# Patient Record
Sex: FEMALE | Race: WHITE | NOT HISPANIC OR LATINO
[De-identification: names, ages, dates, MRNs, and addresses within clinical notes are randomized per-mention and may not be internally consistent; named-entity substitution may affect disease eponyms.]

---

## 2017-01-06 ENCOUNTER — APPOINTMENT (OUTPATIENT)
Dept: OTOLARYNGOLOGY | Facility: CLINIC | Age: 32
End: 2017-01-06

## 2017-01-06 VITALS
HEART RATE: 56 BPM | SYSTOLIC BLOOD PRESSURE: 110 MMHG | OXYGEN SATURATION: 99 % | DIASTOLIC BLOOD PRESSURE: 72 MMHG | TEMPERATURE: 97.7 F

## 2017-01-18 ENCOUNTER — APPOINTMENT (OUTPATIENT)
Dept: OTOLARYNGOLOGY | Facility: CLINIC | Age: 32
End: 2017-01-18

## 2017-01-19 LAB
B BURGDOR AB SER-IMP: NEGATIVE
B BURGDOR IGG+IGM SER QL: 0.11 INDEX
T PALLIDUM AB SER QL IA: NEGATIVE

## 2017-01-20 ENCOUNTER — APPOINTMENT (OUTPATIENT)
Dept: MRI IMAGING | Facility: CLINIC | Age: 32
End: 2017-01-20

## 2017-01-23 ENCOUNTER — FORM ENCOUNTER (OUTPATIENT)
Age: 32
End: 2017-01-23

## 2017-01-24 ENCOUNTER — OUTPATIENT (OUTPATIENT)
Dept: OUTPATIENT SERVICES | Facility: HOSPITAL | Age: 32
LOS: 1 days | End: 2017-01-24
Payer: COMMERCIAL

## 2017-01-24 PROCEDURE — 72141 MRI NECK SPINE W/O DYE: CPT

## 2017-01-24 PROCEDURE — 72141 MRI NECK SPINE W/O DYE: CPT | Mod: 26

## 2017-01-25 ENCOUNTER — APPOINTMENT (OUTPATIENT)
Dept: MRI IMAGING | Facility: IMAGING CENTER | Age: 32
End: 2017-01-25

## 2017-01-26 ENCOUNTER — EMERGENCY (EMERGENCY)
Facility: HOSPITAL | Age: 32
LOS: 1 days | Discharge: HOME | End: 2017-01-26
Attending: EMERGENCY MEDICINE | Admitting: EMERGENCY MEDICINE
Payer: COMMERCIAL

## 2017-01-26 ENCOUNTER — APPOINTMENT (OUTPATIENT)
Dept: OTOLARYNGOLOGY | Facility: CLINIC | Age: 32
End: 2017-01-26

## 2017-01-26 VITALS
OXYGEN SATURATION: 100 % | HEART RATE: 58 BPM | SYSTOLIC BLOOD PRESSURE: 109 MMHG | DIASTOLIC BLOOD PRESSURE: 57 MMHG | TEMPERATURE: 98 F | RESPIRATION RATE: 18 BRPM

## 2017-01-26 DIAGNOSIS — R42 DIZZINESS AND GIDDINESS: ICD-10-CM

## 2017-01-26 DIAGNOSIS — Z79.891 LONG TERM (CURRENT) USE OF OPIATE ANALGESIC: ICD-10-CM

## 2017-01-26 PROCEDURE — 99283 EMERGENCY DEPT VISIT LOW MDM: CPT

## 2017-01-26 NOTE — ED PROVIDER NOTE - MEDICAL DECISION MAKING DETAILS
well appearing, nml gait, nml neuro- has outpx neurologist following- continue with current plan- outpx PT- px appears well- is a nurse agrees w d/c

## 2017-01-26 NOTE — ED ADULT TRIAGE NOTE - CHIEF COMPLAINT QUOTE
Sent in by ENT for further evaluation of vertigo, c/o of worsening dizziness since December, denies numbness, headache.

## 2017-01-26 NOTE — ED ADULT NURSE NOTE - OBJECTIVE STATEMENT
Patient referred to ED from ENT office with c/o dizziness. Patient A&Ox3. Ambulating with steady gait. No s/s acute distress noted. Awaiting to be seen. Patient referred to ED from ENT office with c/o dizziness. C/o intermittent nausea, denies any dizziness. Reports head injury 1 1/2 years ago. Patient reports outpatient MRI done. Patient A&Ox3. Ambulating with steady gait. No s/s acute distress noted. Awaiting to be seen.

## 2017-01-26 NOTE — ED PROVIDER NOTE - OBJECTIVE STATEMENT
31 F co chronic dizziness- feels off balance- pressure in head- feels like she's looking down on the world- no falls- no gait abnormalities- no sx- sx worse with changes in position no sob no chest pain  no n/v- has had neg head MRI/neck MRI  has been seen by neuro- rec'd for PT for vertigo  hx of mild concussion about 1 yr ago- otherwise no hx of syncope  px regularly exercises- 6 d a week in the gym

## 2017-02-02 LAB — INNER EAR 68KD AB FLD QL: NEGATIVE

## 2017-02-07 ENCOUNTER — APPOINTMENT (OUTPATIENT)
Dept: MRI IMAGING | Facility: CLINIC | Age: 32
End: 2017-02-07

## 2017-02-07 ENCOUNTER — APPOINTMENT (OUTPATIENT)
Dept: RADIOLOGY | Facility: CLINIC | Age: 32
End: 2017-02-07

## 2017-02-07 ENCOUNTER — OUTPATIENT (OUTPATIENT)
Dept: OUTPATIENT SERVICES | Facility: HOSPITAL | Age: 32
LOS: 1 days | End: 2017-02-07
Payer: COMMERCIAL

## 2017-02-07 DIAGNOSIS — Z00.8 ENCOUNTER FOR OTHER GENERAL EXAMINATION: ICD-10-CM

## 2017-02-07 PROCEDURE — 70544 MR ANGIOGRAPHY HEAD W/O DYE: CPT

## 2017-02-07 PROCEDURE — 70547 MR ANGIOGRAPHY NECK W/O DYE: CPT

## 2017-02-07 PROCEDURE — 72050 X-RAY EXAM NECK SPINE 4/5VWS: CPT

## 2017-09-10 ENCOUNTER — RESULT REVIEW (OUTPATIENT)
Age: 32
End: 2017-09-10

## 2018-07-19 NOTE — ED ADULT NURSE NOTE - CAS DISCH TRANSFER METHOD
Patient states she has taken the last Letrozole Dr Narayan Thacker prescribed and the Rx states no refills - Her surgery is 07/27/18 - Does she need a refill?     Cc: Stephanie Lyons MA
Private car

## 2018-07-31 ENCOUNTER — TRANSCRIPTION ENCOUNTER (OUTPATIENT)
Age: 33
End: 2018-07-31

## 2018-08-10 ENCOUNTER — APPOINTMENT (OUTPATIENT)
Dept: OTOLARYNGOLOGY | Facility: CLINIC | Age: 33
End: 2018-08-10

## 2020-02-08 ENCOUNTER — OUTPATIENT (OUTPATIENT)
Dept: OUTPATIENT SERVICES | Facility: HOSPITAL | Age: 35
LOS: 1 days | End: 2020-02-08
Payer: COMMERCIAL

## 2020-02-08 ENCOUNTER — APPOINTMENT (OUTPATIENT)
Dept: MRI IMAGING | Facility: IMAGING CENTER | Age: 35
End: 2020-02-08
Payer: COMMERCIAL

## 2020-02-08 DIAGNOSIS — Z00.8 ENCOUNTER FOR OTHER GENERAL EXAMINATION: ICD-10-CM

## 2020-02-08 PROCEDURE — 72141 MRI NECK SPINE W/O DYE: CPT | Mod: 26

## 2020-02-08 PROCEDURE — 72141 MRI NECK SPINE W/O DYE: CPT

## 2020-04-25 ENCOUNTER — MESSAGE (OUTPATIENT)
Age: 35
End: 2020-04-25

## 2020-07-02 LAB
SARS-COV-2 IGG SERPL IA-ACNC: 0.09 INDEX
SARS-COV-2 IGG SERPL QL IA: NEGATIVE

## 2020-09-23 ENCOUNTER — TRANSCRIPTION ENCOUNTER (OUTPATIENT)
Age: 35
End: 2020-09-23

## 2020-12-03 ENCOUNTER — APPOINTMENT (OUTPATIENT)
Dept: HUMAN REPRODUCTION | Facility: CLINIC | Age: 35
End: 2020-12-03
Payer: COMMERCIAL

## 2020-12-03 PROCEDURE — 99203 OFFICE O/P NEW LOW 30 MIN: CPT | Mod: 95

## 2021-01-20 ENCOUNTER — APPOINTMENT (OUTPATIENT)
Dept: HUMAN REPRODUCTION | Facility: CLINIC | Age: 36
End: 2021-01-20
Payer: COMMERCIAL

## 2021-01-20 PROCEDURE — 99072 ADDL SUPL MATRL&STAF TM PHE: CPT

## 2021-01-20 PROCEDURE — 99214 OFFICE O/P EST MOD 30 MIN: CPT | Mod: 25

## 2021-01-20 PROCEDURE — 36415 COLL VENOUS BLD VENIPUNCTURE: CPT

## 2021-01-20 PROCEDURE — 76830 TRANSVAGINAL US NON-OB: CPT

## 2021-02-03 ENCOUNTER — APPOINTMENT (OUTPATIENT)
Dept: HUMAN REPRODUCTION | Facility: CLINIC | Age: 36
End: 2021-02-03

## 2021-02-16 ENCOUNTER — APPOINTMENT (OUTPATIENT)
Dept: HUMAN REPRODUCTION | Facility: CLINIC | Age: 36
End: 2021-02-16

## 2021-03-03 ENCOUNTER — APPOINTMENT (OUTPATIENT)
Dept: HUMAN REPRODUCTION | Facility: CLINIC | Age: 36
End: 2021-03-03
Payer: COMMERCIAL

## 2021-03-03 PROCEDURE — 76830 TRANSVAGINAL US NON-OB: CPT

## 2021-03-03 PROCEDURE — 99213 OFFICE O/P EST LOW 20 MIN: CPT | Mod: 25

## 2021-03-03 PROCEDURE — 99072 ADDL SUPL MATRL&STAF TM PHE: CPT

## 2021-03-03 PROCEDURE — 36415 COLL VENOUS BLD VENIPUNCTURE: CPT

## 2021-03-16 ENCOUNTER — APPOINTMENT (OUTPATIENT)
Dept: OBGYN | Facility: CLINIC | Age: 36
End: 2021-03-16
Payer: COMMERCIAL

## 2021-03-16 PROCEDURE — 99213 OFFICE O/P EST LOW 20 MIN: CPT

## 2021-03-16 PROCEDURE — 36415 COLL VENOUS BLD VENIPUNCTURE: CPT

## 2021-03-16 PROCEDURE — 99072 ADDL SUPL MATRL&STAF TM PHE: CPT

## 2021-03-29 ENCOUNTER — APPOINTMENT (OUTPATIENT)
Dept: ANTEPARTUM | Facility: CLINIC | Age: 36
End: 2021-03-29
Payer: COMMERCIAL

## 2021-03-29 PROCEDURE — 76801 OB US < 14 WKS SINGLE FETUS: CPT

## 2021-03-29 PROCEDURE — 99072 ADDL SUPL MATRL&STAF TM PHE: CPT

## 2021-04-05 ENCOUNTER — APPOINTMENT (OUTPATIENT)
Dept: OBGYN | Facility: CLINIC | Age: 36
End: 2021-04-05
Payer: COMMERCIAL

## 2021-04-05 PROCEDURE — 99072 ADDL SUPL MATRL&STAF TM PHE: CPT

## 2021-04-05 PROCEDURE — 36415 COLL VENOUS BLD VENIPUNCTURE: CPT

## 2021-04-12 ENCOUNTER — APPOINTMENT (OUTPATIENT)
Dept: OBGYN | Facility: CLINIC | Age: 36
End: 2021-04-12
Payer: COMMERCIAL

## 2021-04-12 PROCEDURE — 76817 TRANSVAGINAL US OBSTETRIC: CPT

## 2021-04-12 PROCEDURE — 99072 ADDL SUPL MATRL&STAF TM PHE: CPT

## 2021-04-13 ENCOUNTER — TRANSCRIPTION ENCOUNTER (OUTPATIENT)
Age: 36
End: 2021-04-13

## 2021-04-13 ENCOUNTER — APPOINTMENT (OUTPATIENT)
Dept: OBGYN | Facility: CLINIC | Age: 36
End: 2021-04-13

## 2021-04-13 ENCOUNTER — OUTPATIENT (OUTPATIENT)
Dept: OUTPATIENT SERVICES | Facility: HOSPITAL | Age: 36
LOS: 1 days | End: 2021-04-13

## 2021-04-13 DIAGNOSIS — Z20.822 CONTACT WITH AND (SUSPECTED) EXPOSURE TO COVID-19: ICD-10-CM

## 2021-04-13 LAB — SARS-COV-2 RNA SPEC QL NAA+PROBE: SIGNIFICANT CHANGE UP

## 2021-04-13 NOTE — ASU PATIENT PROFILE, ADULT - ABILITY TO HEAR (WITH HEARING AID OR HEARING APPLIANCE IF NORMALLY USED):
uses bilateral hearing aids/Mildly to Moderately Impaired: difficulty hearing in some environments or speaker may need to increase volume or speak distinctly

## 2021-04-14 ENCOUNTER — RESULT REVIEW (OUTPATIENT)
Age: 36
End: 2021-04-14

## 2021-04-14 ENCOUNTER — OUTPATIENT (OUTPATIENT)
Dept: OUTPATIENT SERVICES | Facility: HOSPITAL | Age: 36
LOS: 1 days | Discharge: HOME | End: 2021-04-14
Payer: COMMERCIAL

## 2021-04-14 VITALS
RESPIRATION RATE: 15 BRPM | SYSTOLIC BLOOD PRESSURE: 100 MMHG | HEART RATE: 61 BPM | WEIGHT: 138.01 LBS | OXYGEN SATURATION: 100 % | TEMPERATURE: 98 F | DIASTOLIC BLOOD PRESSURE: 61 MMHG | HEIGHT: 66 IN

## 2021-04-14 VITALS
RESPIRATION RATE: 19 BRPM | OXYGEN SATURATION: 100 % | DIASTOLIC BLOOD PRESSURE: 62 MMHG | HEART RATE: 68 BPM | SYSTOLIC BLOOD PRESSURE: 112 MMHG

## 2021-04-14 DIAGNOSIS — O02.1 MISSED ABORTION: ICD-10-CM

## 2021-04-14 DIAGNOSIS — Z98.890 OTHER SPECIFIED POSTPROCEDURAL STATES: Chronic | ICD-10-CM

## 2021-04-14 LAB
ALBUMIN SERPL ELPH-MCNC: 3.9 G/DL — SIGNIFICANT CHANGE UP (ref 3.3–5)
ALP SERPL-CCNC: 39 U/L — LOW (ref 40–120)
ALT FLD-CCNC: 17 U/L — SIGNIFICANT CHANGE UP (ref 4–33)
ANION GAP SERPL CALC-SCNC: 9 MMOL/L — SIGNIFICANT CHANGE UP (ref 7–14)
APTT BLD: 28.6 SEC — SIGNIFICANT CHANGE UP (ref 27–36.3)
AST SERPL-CCNC: 20 U/L — SIGNIFICANT CHANGE UP (ref 4–32)
BILIRUB SERPL-MCNC: 0.3 MG/DL — SIGNIFICANT CHANGE UP (ref 0.2–1.2)
BLD GP AB SCN SERPL QL: NEGATIVE — SIGNIFICANT CHANGE UP
BUN SERPL-MCNC: 15 MG/DL — SIGNIFICANT CHANGE UP (ref 7–23)
CALCIUM SERPL-MCNC: 8.8 MG/DL — SIGNIFICANT CHANGE UP (ref 8.4–10.5)
CHLORIDE SERPL-SCNC: 106 MMOL/L — SIGNIFICANT CHANGE UP (ref 98–107)
CO2 SERPL-SCNC: 23 MMOL/L — SIGNIFICANT CHANGE UP (ref 22–31)
CREAT SERPL-MCNC: 0.66 MG/DL — SIGNIFICANT CHANGE UP (ref 0.5–1.3)
FIBRINOGEN PPP-MCNC: 308 MG/DL — SIGNIFICANT CHANGE UP (ref 290–520)
GLUCOSE SERPL-MCNC: 107 MG/DL — HIGH (ref 70–99)
HCT VFR BLD CALC: 33.6 % — LOW (ref 34.5–45)
HCT VFR BLD CALC: 36.5 % — SIGNIFICANT CHANGE UP (ref 34.5–45)
HGB BLD-MCNC: 11.7 G/DL — SIGNIFICANT CHANGE UP (ref 11.5–15.5)
HGB BLD-MCNC: 12.7 G/DL — SIGNIFICANT CHANGE UP (ref 11.5–15.5)
INR BLD: 1.07 RATIO — SIGNIFICANT CHANGE UP (ref 0.88–1.16)
MCHC RBC-ENTMCNC: 32.8 PG — SIGNIFICANT CHANGE UP (ref 27–34)
MCHC RBC-ENTMCNC: 32.9 PG — SIGNIFICANT CHANGE UP (ref 27–34)
MCHC RBC-ENTMCNC: 34.8 GM/DL — SIGNIFICANT CHANGE UP (ref 32–36)
MCHC RBC-ENTMCNC: 34.8 GM/DL — SIGNIFICANT CHANGE UP (ref 32–36)
MCV RBC AUTO: 94.3 FL — SIGNIFICANT CHANGE UP (ref 80–100)
MCV RBC AUTO: 94.4 FL — SIGNIFICANT CHANGE UP (ref 80–100)
NRBC # BLD: 0 /100 WBCS — SIGNIFICANT CHANGE UP
NRBC # BLD: 0 /100 WBCS — SIGNIFICANT CHANGE UP
NRBC # FLD: 0 K/UL — SIGNIFICANT CHANGE UP
NRBC # FLD: 0 K/UL — SIGNIFICANT CHANGE UP
PLATELET # BLD AUTO: 206 K/UL — SIGNIFICANT CHANGE UP (ref 150–400)
PLATELET # BLD AUTO: 256 K/UL — SIGNIFICANT CHANGE UP (ref 150–400)
POTASSIUM SERPL-MCNC: 4.1 MMOL/L — SIGNIFICANT CHANGE UP (ref 3.5–5.3)
POTASSIUM SERPL-SCNC: 4.1 MMOL/L — SIGNIFICANT CHANGE UP (ref 3.5–5.3)
PROT SERPL-MCNC: 6.1 G/DL — SIGNIFICANT CHANGE UP (ref 6–8.3)
PROTHROM AB SERPL-ACNC: 12.2 SEC — SIGNIFICANT CHANGE UP (ref 10.6–13.6)
RBC # BLD: 3.56 M/UL — LOW (ref 3.8–5.2)
RBC # BLD: 3.87 M/UL — SIGNIFICANT CHANGE UP (ref 3.8–5.2)
RBC # FLD: 11.8 % — SIGNIFICANT CHANGE UP (ref 10.3–14.5)
RBC # FLD: 11.8 % — SIGNIFICANT CHANGE UP (ref 10.3–14.5)
RH IG SCN BLD-IMP: NEGATIVE — SIGNIFICANT CHANGE UP
SODIUM SERPL-SCNC: 138 MMOL/L — SIGNIFICANT CHANGE UP (ref 135–145)
WBC # BLD: 4.91 K/UL — SIGNIFICANT CHANGE UP (ref 3.8–10.5)
WBC # BLD: 6.28 K/UL — SIGNIFICANT CHANGE UP (ref 3.8–10.5)
WBC # FLD AUTO: 4.91 K/UL — SIGNIFICANT CHANGE UP (ref 3.8–10.5)
WBC # FLD AUTO: 6.28 K/UL — SIGNIFICANT CHANGE UP (ref 3.8–10.5)

## 2021-04-14 PROCEDURE — 88305 TISSUE EXAM BY PATHOLOGIST: CPT | Mod: 26

## 2021-04-14 PROCEDURE — 59840 INDUCED ABORTION D&C: CPT

## 2021-04-14 RX ORDER — SODIUM CHLORIDE 9 MG/ML
3 INJECTION INTRAMUSCULAR; INTRAVENOUS; SUBCUTANEOUS EVERY 8 HOURS
Refills: 0 | Status: DISCONTINUED | OUTPATIENT
Start: 2021-04-14 | End: 2021-04-28

## 2021-04-14 RX ORDER — SODIUM CHLORIDE 9 MG/ML
1000 INJECTION, SOLUTION INTRAVENOUS
Refills: 0 | Status: DISCONTINUED | OUTPATIENT
Start: 2021-04-14 | End: 2021-04-28

## 2021-04-14 RX ORDER — OXYCODONE HYDROCHLORIDE 5 MG/1
5 TABLET ORAL ONCE
Refills: 0 | Status: DISCONTINUED | OUTPATIENT
Start: 2021-04-14 | End: 2021-04-14

## 2021-04-14 RX ADMIN — OXYCODONE HYDROCHLORIDE 5 MILLIGRAM(S): 5 TABLET ORAL at 11:25

## 2021-04-14 NOTE — ASU DISCHARGE PLAN (ADULT/PEDIATRIC) - CALL YOUR DOCTOR IF YOU HAVE ANY OF THE FOLLOWING:
Bleeding that does not stop/Pain not relieved by Medications/Fever greater than (need to indicate Fahrenheit or Celsius)/Nausea and vomiting that does not stop/Unable to urinate/Excessive diarrhea/Inability to tolerate liquids or foods

## 2021-04-14 NOTE — H&P PST ADULT - ATTENDING COMMENTS
Patient seen and examined. She is scheduled for DVC for MAB measuring 8+0wga. Written consent obtained.  SOLITARIO Vega MD

## 2021-04-14 NOTE — BRIEF OPERATIVE NOTE - OPERATION/FINDINGS
Cervix easily dilated to accommodate a hegar size 8. Small amount of POC adherent to endometrial tissue. Ultrasound performed in order to ensure that gestational sac was removed. Ultrasound showed uterine cavity filling with blood. Dr. Almonte called in for assistance. Methergine, pitocin and cytotec given with good resolution of atony.

## 2021-04-14 NOTE — ASU DISCHARGE PLAN (ADULT/PEDIATRIC) - NURSING INSTRUCTIONS
DO NOT take any Tylenol (Acetaminophen) or narcotics containing Tylenol until after  3:30pm. You received Tylenol during your operation and it can cause damage to your liver if too much is taken within a 24 hour time period. You have received ibuprofen in the OR during you procedure.  You may not have another dose until 3:45pm.

## 2021-04-14 NOTE — H&P PST ADULT - ASSESSMENT
35 y.o.  with MAB measuring 8+0wga  -Admit to [facility] for DVC  -T&S - Rh negative. Will need rhogam  -IVF

## 2021-04-14 NOTE — ASU DISCHARGE PLAN (ADULT/PEDIATRIC) - CARE PROVIDER_API CALL
Surya Almonte)  MaternalFetal Medicine; Obstetrics and Gynecology  [address] [place] [id number]  Phone: [phone]  Fax: [phone]  Follow Up Time:

## 2021-04-16 LAB — SURGICAL PATHOLOGY STUDY: SIGNIFICANT CHANGE UP

## 2021-04-20 ENCOUNTER — APPOINTMENT (OUTPATIENT)
Dept: OBGYN | Facility: CLINIC | Age: 36
End: 2021-04-20
Payer: COMMERCIAL

## 2021-04-20 PROCEDURE — 76830 TRANSVAGINAL US NON-OB: CPT

## 2021-04-20 PROCEDURE — 76857 US EXAM PELVIC LIMITED: CPT

## 2021-04-20 PROCEDURE — 99072 ADDL SUPL MATRL&STAF TM PHE: CPT

## 2021-08-17 DIAGNOSIS — Z87.42 PERSONAL HISTORY OF OTHER DISEASES OF THE FEMALE GENITAL TRACT: ICD-10-CM

## 2021-08-18 ENCOUNTER — APPOINTMENT (OUTPATIENT)
Dept: OBGYN | Facility: CLINIC | Age: 36
End: 2021-08-18
Payer: COMMERCIAL

## 2021-08-18 PROBLEM — Z00.00 ENCOUNTER FOR PREVENTIVE HEALTH EXAMINATION: Noted: 2021-08-18

## 2021-08-18 PROCEDURE — ZZZZZ: CPT

## 2021-09-27 ENCOUNTER — APPOINTMENT (OUTPATIENT)
Dept: OBGYN | Facility: CLINIC | Age: 36
End: 2021-09-27

## 2021-09-27 ENCOUNTER — APPOINTMENT (OUTPATIENT)
Dept: OBGYN | Facility: CLINIC | Age: 36
End: 2021-09-27
Payer: COMMERCIAL

## 2021-09-27 VITALS — DIASTOLIC BLOOD PRESSURE: 88 MMHG | WEIGHT: 138 LBS | BODY MASS INDEX: 22.27 KG/M2 | SYSTOLIC BLOOD PRESSURE: 133 MMHG

## 2021-09-27 PROCEDURE — 99395 PREV VISIT EST AGE 18-39: CPT

## 2021-09-27 NOTE — PLAN
[FreeTextEntry1] : 35 year old PX presenting for annual exam.\par -uterus is normal sized and mobile\par -f/u PAP and GC/CT done today\par -contraception:\par -f/u PRN

## 2021-09-29 LAB
C TRACH RRNA SPEC QL NAA+PROBE: NOT DETECTED
HPV 16 E6+E7 MRNA CVX QL NAA+PROBE: NOT DETECTED
HPV HIGH+LOW RISK DNA PNL CVX: NOT DETECTED
HPV18+45 E6+E7 MRNA CVX QL NAA+PROBE: NOT DETECTED
N GONORRHOEA RRNA SPEC QL NAA+PROBE: NOT DETECTED
SOURCE AMPLIFICATION: NORMAL

## 2021-10-04 LAB — CYTOLOGY CVX/VAG DOC THIN PREP: NORMAL

## 2021-11-10 NOTE — ASU DISCHARGE PLAN (ADULT/PEDIATRIC) - A. DRIVE A CAR, OPERATE POWER TOOLS OR MACHINERY
Patient Education     Viral Syndrome (Adult)  A viral illness may cause a number of symptoms such as fever. Other symptoms depend on the part of the body that the virus affects. If it settles in your nose, throat, and lungs, it may cause cough, sore throat, congestion, runny nose, headache, earache and other ear symptoms, or shortness of breath. If it settles in your stomach and intestinal tract, it may cause nausea, vomiting, cramping, and diarrhea. Sometimes it causes generalized symptoms like \"aching all over,\" feeling tired, loss of energy, or loss of appetite.  A viral illness usually lasts anywhere from several days to several weeks, but sometimes it lasts longer. In some cases, a more serious infection can look like a viral syndrome in the first few days of the illness. You may need another exam and additional tests to know the difference. Watch for the warning signs listed below for when to seek medical advice.  Home care  Follow these guidelines for taking care of yourself at home:  · If symptoms are severe, rest at home for the first 2 to 3 days.  · Stay away from cigarette smoke - both your smoke and the smoke from others.  · You may use over-the-counter acetaminophen or ibuprofen for fever, muscle aching, and headache, unless another medicine was prescribed for this. If you have chronic liver or kidney disease or ever had a stomach ulcer or gastrointestinal bleeding, talk with your healthcare provider before using these medicines. No one who is younger than 18 and ill with a fever should take aspirin. It may cause severe disease or death.  · Your appetite may be poor, so a light diet is fine. Avoid dehydration by drinking 8 to 12, 8-ounce glasses of fluids each day. This may include water; orange juice; lemonade; apple, grape, and cranberry juice; clear fruit drinks; electrolyte replacement and sports drinks; and decaffeinated teas and coffee. If you have been diagnosed with a kidney disease, ask your  healthcare provider how much and what types of fluids you should drink to prevent dehydration. If you have kidney disease, drinking too much fluid can cause it build up in the your body and be dangerous to your health.  · Over-the-counter remedies won't shorten the length of the illness but may be helpful for symptoms such as cough, sore throat, nasal and sinus congestion, or diarrhea. Don't use decongestants if you have high blood pressure.  Follow-up care  Follow up with your healthcare provider if you do not improve over the next week.  Call 911  Call 911 if any of the following occur:  · Convulsion  · Feeling weak, dizzy, or like you are going to faint  · Chest pain, or more than mild shortness of breath  When to seek medical advice  Call your healthcare provider right away if any of these occur:  · Cough with lots of colored sputum (mucus) or blood in your sputum  · Chest pain, shortness of breath, wheezing, or trouble breathing  · Severe headache; face, neck, or ear pain  · Severe, constant pain in the lower right side of your belly (abdominal)  · Continued vomiting (can’t keep liquids down)  · Frequent diarrhea (more than 5 times a day); blood (red or black color) or mucus in diarrhea  · Feeling weak, dizzy, or like you are going to faint  · Extreme thirst  · Fever of 100.4°F (38°C) or higher, or as directed by your healthcare provider  Isabel last reviewed this educational content on 4/1/2018 © 2000-2021 [employer]. All rights reserved. This information is not intended as a substitute for professional medical care. Always follow your healthcare professional's instructions.            Statement Selected

## 2022-03-24 ENCOUNTER — TRANSCRIPTION ENCOUNTER (OUTPATIENT)
Age: 37
End: 2022-03-24

## 2022-05-04 ENCOUNTER — APPOINTMENT (OUTPATIENT)
Dept: OBGYN | Facility: CLINIC | Age: 37
End: 2022-05-04
Payer: COMMERCIAL

## 2022-05-04 VITALS
HEIGHT: 66 IN | DIASTOLIC BLOOD PRESSURE: 76 MMHG | BODY MASS INDEX: 22.34 KG/M2 | SYSTOLIC BLOOD PRESSURE: 112 MMHG | WEIGHT: 139 LBS

## 2022-05-04 DIAGNOSIS — Z87.42 PERSONAL HISTORY OF OTHER DISEASES OF THE FEMALE GENITAL TRACT: ICD-10-CM

## 2022-05-04 DIAGNOSIS — Z87.2 PERSONAL HISTORY OF DISEASES OF THE SKIN AND SUBCUTANEOUS TISSUE: ICD-10-CM

## 2022-05-04 DIAGNOSIS — Z87.898 PERSONAL HISTORY OF OTHER SPECIFIED CONDITIONS: ICD-10-CM

## 2022-05-04 DIAGNOSIS — Z86.69 PERSONAL HISTORY OF OTHER DISEASES OF THE NERVOUS SYSTEM AND SENSE ORGANS: ICD-10-CM

## 2022-05-04 DIAGNOSIS — H81.393 OTHER PERIPHERAL VERTIGO, BILATERAL: ICD-10-CM

## 2022-05-04 DIAGNOSIS — Z80.49 FAMILY HISTORY OF MALIGNANT NEOPLASM OF OTHER GENITAL ORGANS: ICD-10-CM

## 2022-05-04 DIAGNOSIS — O21.9 VOMITING OF PREGNANCY, UNSPECIFIED: ICD-10-CM

## 2022-05-04 DIAGNOSIS — O36.0991: ICD-10-CM

## 2022-05-04 DIAGNOSIS — Z80.3 FAMILY HISTORY OF MALIGNANT NEOPLASM OF BREAST: ICD-10-CM

## 2022-05-04 PROCEDURE — 99214 OFFICE O/P EST MOD 30 MIN: CPT

## 2022-05-04 PROCEDURE — 87490 CHLMYD TRACH DNA DIR PROBE: CPT

## 2022-05-04 PROCEDURE — 87590 N.GONORRHOEAE DNA DIR PROB: CPT

## 2022-05-04 PROCEDURE — 36415 COLL VENOUS BLD VENIPUNCTURE: CPT

## 2022-05-04 RX ORDER — ASCORBIC ACID, CHOLECALCIFEROL, .ALPHA.-TOCOPHEROL ACETATE, DL-, PYRIDOXINE, FOLIC ACID, CYANOCOBALAMIN, CALCIUM, FERROUS FUMARATE, MAGNESIUM, DOCONEXENT 85; 200; 10; 25; 1; 12; 140; 27; 45; 300 [IU]/1; [IU]/1; [IU]/1; [IU]/1; MG/1; UG/1; MG/1; MG/1; MG/1; MG/1
27-0.6-0.4-3 CAPSULE, GELATIN COATED ORAL
Qty: 90 | Refills: 4 | Status: COMPLETED | COMMUNITY
Start: 2021-11-01 | End: 2022-05-04

## 2022-05-04 RX ORDER — DOXYLAMINE SUCCINATE AND PYRIDOXINE HYDROCHLORIDE 10; 10 MG/1; MG/1
10-10 TABLET, DELAYED RELEASE ORAL
Qty: 30 | Refills: 6 | Status: ACTIVE | COMMUNITY
Start: 2022-05-04 | End: 1900-01-01

## 2022-05-04 RX ORDER — NORETHINDRONE ACETATE AND ETHINYL ESTRADIOL AND FERROUS FUMARATE 1.5-30(21)
1.5-3 KIT ORAL DAILY
Qty: 1 | Refills: 3 | Status: COMPLETED | COMMUNITY
Start: 2021-09-15 | End: 2022-05-04

## 2022-05-04 RX ORDER — CLOTRIMAZOLE 10 MG/G
1 CREAM TOPICAL
Qty: 30 | Refills: 0 | Status: COMPLETED | COMMUNITY
Start: 2017-01-22 | End: 2022-05-04

## 2022-05-04 RX ORDER — ASCORBIC ACID, CHOLECALCIFEROL, .ALPHA.-TOCOPHEROL ACETATE, DL-, PYRIDOXINE, FOLIC ACID, CYANOCOBALAMIN, CALCIUM, FERROUS FUMARATE, MAGNESIUM, DOCONEXENT 85; 200; 10; 25; 1; 12; 140; 27; 45; 300 [IU]/1; [IU]/1; [IU]/1; [IU]/1; MG/1; UG/1; MG/1; MG/1; MG/1; MG/1
27-0.6-0.4-3 CAPSULE, GELATIN COATED ORAL
Refills: 0 | Status: COMPLETED | COMMUNITY
End: 2022-05-04

## 2022-05-04 RX ORDER — ASCORBIC ACID, CHOLECALCIFEROL, .ALPHA.-TOCOPHEROL ACETATE, DL-, PYRIDOXINE, FOLIC ACID, CYANOCOBALAMIN, CALCIUM, FERROUS FUMARATE, MAGNESIUM, DOCONEXENT 85; 200; 10; 25; 1; 12; 140; 27; 45; 300 [IU]/1; [IU]/1; [IU]/1; [IU]/1; MG/1; UG/1; MG/1; MG/1; MG/1; MG/1
27-0.6-0.4-3 CAPSULE, GELATIN COATED ORAL
Qty: 30 | Refills: 8 | Status: COMPLETED | COMMUNITY
Start: 2021-09-27 | End: 2022-05-04

## 2022-05-04 RX ORDER — PREDNISONE 10 MG/1
10 TABLET ORAL
Qty: 20 | Refills: 0 | Status: COMPLETED | COMMUNITY
Start: 2017-01-22 | End: 2022-05-04

## 2022-05-04 RX ORDER — CHROMIUM 200 MCG
TABLET ORAL
Refills: 0 | Status: ACTIVE | COMMUNITY

## 2022-05-04 RX ORDER — MONTELUKAST 10 MG/1
10 TABLET, FILM COATED ORAL
Qty: 30 | Refills: 0 | Status: COMPLETED | COMMUNITY
Start: 2016-12-12 | End: 2022-05-04

## 2022-05-04 RX ORDER — LEVOCETIRIZINE DIHYDROCHLORIDE 5 MG/1
5 TABLET ORAL
Qty: 30 | Refills: 0 | Status: COMPLETED | COMMUNITY
Start: 2016-11-28 | End: 2022-05-04

## 2022-05-05 LAB
25(OH)D3 SERPL-MCNC: 37 NG/ML
ABO + RH PNL BLD: NORMAL
APPEARANCE: CLEAR
BACTERIA: NEGATIVE
BASOPHILS # BLD AUTO: 0.04 K/UL
BASOPHILS NFR BLD AUTO: 0.4 %
BILIRUBIN URINE: NEGATIVE
BLD GP AB SCN SERPL QL: NORMAL
BLOOD URINE: NEGATIVE
COLOR: YELLOW
EOSINOPHIL # BLD AUTO: 0.27 K/UL
EOSINOPHIL NFR BLD AUTO: 2.9 %
ESTIMATED AVERAGE GLUCOSE: 82 MG/DL
GLUCOSE QUALITATIVE U: NEGATIVE
GLUCOSE SERPL-MCNC: 80 MG/DL
HBA1C MFR BLD HPLC: 4.5 %
HCT VFR BLD CALC: 38.8 %
HGB BLD-MCNC: 13 G/DL
HIV1+2 AB SPEC QL IA.RAPID: NONREACTIVE
HYALINE CASTS: 1 /LPF
IMM GRANULOCYTES NFR BLD AUTO: 0.2 %
KETONES URINE: ABNORMAL
LEUKOCYTE ESTERASE URINE: NEGATIVE
LYMPHOCYTES # BLD AUTO: 2.45 K/UL
LYMPHOCYTES NFR BLD AUTO: 26 %
MAN DIFF?: NORMAL
MCHC RBC-ENTMCNC: 33.1 PG
MCHC RBC-ENTMCNC: 33.5 GM/DL
MCV RBC AUTO: 98.7 FL
MICROSCOPIC-UA: NORMAL
MONOCYTES # BLD AUTO: 0.63 K/UL
MONOCYTES NFR BLD AUTO: 6.7 %
NEUTROPHILS # BLD AUTO: 6.03 K/UL
NEUTROPHILS NFR BLD AUTO: 63.8 %
NITRITE URINE: NEGATIVE
PH URINE: 6
PLATELET # BLD AUTO: 283 K/UL
PROTEIN URINE: NEGATIVE
RBC # BLD: 3.93 M/UL
RBC # FLD: 12.3 %
RED BLOOD CELLS URINE: 4 /HPF
SPECIFIC GRAVITY URINE: 1.02
SQUAMOUS EPITHELIAL CELLS: 1 /HPF
UROBILINOGEN URINE: NORMAL
WBC # FLD AUTO: 9.44 K/UL
WHITE BLOOD CELLS URINE: 1 /HPF

## 2022-05-06 LAB
BACTERIA UR CULT: NORMAL
C TRACH RRNA SPEC QL NAA+PROBE: NOT DETECTED
HAV IGM SER QL: NONREACTIVE
HBV CORE IGM SER QL: NONREACTIVE
HBV SURFACE AG SER QL: NONREACTIVE
HBV SURFACE AG SER QL: NONREACTIVE
HCV AB SER QL: NONREACTIVE
HCV S/CO RATIO: 0.09 S/CO
LEAD BLD-MCNC: <1 UG/DL
MEV IGG FLD QL IA: >300 AU/ML
MEV IGG+IGM SER-IMP: POSITIVE
MUV AB SER-ACNC: POSITIVE
MUV IGG SER QL IA: 78.1 AU/ML
N GONORRHOEA RRNA SPEC QL NAA+PROBE: NOT DETECTED
RUBV IGG FLD-ACNC: 15.4 INDEX
RUBV IGG SER-IMP: POSITIVE
SOURCE AMPLIFICATION: NORMAL
T PALLIDUM AB SER QL IA: NEGATIVE

## 2022-05-08 LAB
HGB A MFR BLD: 97.5 %
HGB A2 MFR BLD: 2.5 %
HGB FRACT BLD-IMP: NORMAL
M TB IFN-G BLD-IMP: NEGATIVE
QUANTIFERON TB PLUS MITOGEN MINUS NIL: 9.98 IU/ML
QUANTIFERON TB PLUS NIL: 0.02 IU/ML
QUANTIFERON TB PLUS TB1 MINUS NIL: 0.02 IU/ML
QUANTIFERON TB PLUS TB2 MINUS NIL: 0.02 IU/ML

## 2022-05-10 LAB — FMR1 GENE MUT ANL BLD/T: NORMAL

## 2022-05-11 LAB — AR GENE MUT ANL BLD/T: NORMAL

## 2022-05-16 ENCOUNTER — APPOINTMENT (OUTPATIENT)
Dept: OBGYN | Facility: CLINIC | Age: 37
End: 2022-05-16
Payer: COMMERCIAL

## 2022-05-16 PROCEDURE — 76801 OB US < 14 WKS SINGLE FETUS: CPT

## 2022-05-27 LAB — CFTR MUT TESTED BLD/T: NEGATIVE

## 2022-06-01 ENCOUNTER — APPOINTMENT (OUTPATIENT)
Dept: OBGYN | Facility: CLINIC | Age: 37
End: 2022-06-01
Payer: COMMERCIAL

## 2022-06-01 VITALS — SYSTOLIC BLOOD PRESSURE: 101 MMHG | WEIGHT: 143 LBS | BODY MASS INDEX: 23.08 KG/M2 | DIASTOLIC BLOOD PRESSURE: 65 MMHG

## 2022-06-01 DIAGNOSIS — Z3A.11 11 WEEKS GESTATION OF PREGNANCY: ICD-10-CM

## 2022-06-01 DIAGNOSIS — Z36.82 ENCOUNTER FOR ANTENATAL SCREENING FOR NUCHAL TRANSLUCENCY: ICD-10-CM

## 2022-06-01 PROCEDURE — 0502F SUBSEQUENT PRENATAL CARE: CPT

## 2022-06-01 PROCEDURE — 76813 OB US NUCHAL MEAS 1 GEST: CPT

## 2022-06-06 LAB
1ST TRIMESTER DATA: NORMAL
ADDENDUM DOC: NORMAL
AFP PNL SERPL: NORMAL
AFP SERPL-ACNC: NORMAL
CLINICAL BIOCHEMIST REVIEW: NORMAL
FREE BETA HCG 1ST TRIMESTER: NORMAL
Lab: NORMAL
NASAL BONE: PRESENT
NOTES NTD: NORMAL
NT: NORMAL
PAPP-A SERPL-ACNC: NORMAL
TRISOMY 18/3: NORMAL

## 2022-06-10 LAB
CLARI ADDITIONAL INFO: NORMAL
CLARI CHROMOSOME 13: NORMAL
CLARI CHROMOSOME 18: NORMAL
CLARI CHROMOSOME 21: NORMAL
CLARI SEX CHROMOSOMES: NORMAL
CLARI TEST COMMENT: NORMAL
CLARITEST NIPT: NORMAL
FETAL FRACT: NORMAL
GESTATION AGE: NORMAL
MATERNAL WEIGHT (LBS):: NORMAL
PLEASE INCLUDE GENDER RESULTS ON THIS REPORT:: NORMAL
TYPE OF PREGNANCY:: NORMAL

## 2022-06-15 ENCOUNTER — NON-APPOINTMENT (OUTPATIENT)
Age: 37
End: 2022-06-15

## 2022-06-15 ENCOUNTER — APPOINTMENT (OUTPATIENT)
Dept: ANTEPARTUM | Facility: CLINIC | Age: 37
End: 2022-06-15

## 2022-06-15 ENCOUNTER — APPOINTMENT (OUTPATIENT)
Dept: OBGYN | Facility: CLINIC | Age: 37
End: 2022-06-15

## 2022-06-15 PROCEDURE — 0502F SUBSEQUENT PRENATAL CARE: CPT

## 2022-06-25 ENCOUNTER — OUTPATIENT (OUTPATIENT)
Dept: OUTPATIENT SERVICES | Facility: HOSPITAL | Age: 37
LOS: 1 days | Discharge: HOME | End: 2022-06-25

## 2022-06-25 VITALS — SYSTOLIC BLOOD PRESSURE: 117 MMHG | HEART RATE: 77 BPM | DIASTOLIC BLOOD PRESSURE: 66 MMHG

## 2022-06-25 VITALS
RESPIRATION RATE: 15 BRPM | DIASTOLIC BLOOD PRESSURE: 66 MMHG | SYSTOLIC BLOOD PRESSURE: 117 MMHG | HEART RATE: 77 BPM | TEMPERATURE: 98 F

## 2022-06-25 DIAGNOSIS — Z98.890 OTHER SPECIFIED POSTPROCEDURAL STATES: Chronic | ICD-10-CM

## 2022-06-25 DIAGNOSIS — Z3A.00 WEEKS OF GESTATION OF PREGNANCY NOT SPECIFIED: ICD-10-CM

## 2022-06-25 DIAGNOSIS — O26.899 OTHER SPECIFIED PREGNANCY RELATED CONDITIONS, UNSPECIFIED TRIMESTER: ICD-10-CM

## 2022-06-25 LAB
BASOPHILS # BLD AUTO: 0.04 K/UL — SIGNIFICANT CHANGE UP (ref 0–0.2)
BASOPHILS NFR BLD AUTO: 0.5 % — SIGNIFICANT CHANGE UP (ref 0–2)
BLD GP AB SCN SERPL QL: NEGATIVE — SIGNIFICANT CHANGE UP
EOSINOPHIL # BLD AUTO: 0.32 K/UL — SIGNIFICANT CHANGE UP (ref 0–0.5)
EOSINOPHIL NFR BLD AUTO: 4.3 % — SIGNIFICANT CHANGE UP (ref 0–6)
HCT VFR BLD CALC: 33.6 % — LOW (ref 34.5–45)
HGB BLD-MCNC: 12.1 G/DL — SIGNIFICANT CHANGE UP (ref 11.5–15.5)
IANC: 4.68 K/UL — SIGNIFICANT CHANGE UP (ref 1.8–7.4)
IMM GRANULOCYTES NFR BLD AUTO: 0.7 % — SIGNIFICANT CHANGE UP (ref 0–1.5)
LYMPHOCYTES # BLD AUTO: 1.62 K/UL — SIGNIFICANT CHANGE UP (ref 1–3.3)
LYMPHOCYTES # BLD AUTO: 22 % — SIGNIFICANT CHANGE UP (ref 13–44)
MCHC RBC-ENTMCNC: 35.1 PG — HIGH (ref 27–34)
MCHC RBC-ENTMCNC: 36 GM/DL — SIGNIFICANT CHANGE UP (ref 32–36)
MCV RBC AUTO: 97.4 FL — SIGNIFICANT CHANGE UP (ref 80–100)
MONOCYTES # BLD AUTO: 0.65 K/UL — SIGNIFICANT CHANGE UP (ref 0–0.9)
MONOCYTES NFR BLD AUTO: 8.8 % — SIGNIFICANT CHANGE UP (ref 2–14)
NEUTROPHILS # BLD AUTO: 4.68 K/UL — SIGNIFICANT CHANGE UP (ref 1.8–7.4)
NEUTROPHILS NFR BLD AUTO: 63.7 % — SIGNIFICANT CHANGE UP (ref 43–77)
NRBC # BLD: 0 /100 WBCS — SIGNIFICANT CHANGE UP
NRBC # FLD: 0 K/UL — SIGNIFICANT CHANGE UP
PLATELET # BLD AUTO: 220 K/UL — SIGNIFICANT CHANGE UP (ref 150–400)
RBC # BLD: 3.45 M/UL — LOW (ref 3.8–5.2)
RBC # FLD: 12.3 % — SIGNIFICANT CHANGE UP (ref 10.3–14.5)
RH IG SCN BLD-IMP: NEGATIVE — SIGNIFICANT CHANGE UP
WBC # BLD: 7.36 K/UL — SIGNIFICANT CHANGE UP (ref 3.8–10.5)
WBC # FLD AUTO: 7.36 K/UL — SIGNIFICANT CHANGE UP (ref 3.8–10.5)

## 2022-06-25 PROCEDURE — 99213 OFFICE O/P EST LOW 20 MIN: CPT

## 2022-06-25 NOTE — OB RN TRIAGE NOTE - FALL HARM RISK - UNIVERSAL INTERVENTIONS
Bed in lowest position, wheels locked, appropriate side rails in place/Call bell, personal items and telephone in reach/Instruct patient to call for assistance before getting out of bed or chair/Non-slip footwear when patient is out of bed/[place] to call system/Physically safe environment - no spills, clutter or unnecessary equipment/Purposeful Proactive Rounding/Room/bathroom lighting operational, light cord in reach

## 2022-06-25 NOTE — OB PROVIDER TRIAGE NOTE - HISTORY OF PRESENT ILLNESS
The patient is a 36 y.o.  @ 14+4wga by LMP presenting with vaginal bleeding. She states that she woke up to use the bathroom and noticed that her underwear was soaked in blood, similar to a period. She then went to the bathroom and had a large amount of blood in the toilet. She denies any pain. She denies any issues with the pregnancy.

## 2022-06-25 NOTE — OB PROVIDER TRIAGE NOTE - PLAN OF CARE
Plan d/w Dr. Vega   As per MD no need to wait for CBC results.   to be discharged home   Return to hospital for heavy vaginal bleeding, cramping, leaking of fluid.   Pelvic rest encouraged   Follow up with Dr. Almonte on Monday

## 2022-06-25 NOTE — OB RN TRIAGE NOTE - NSNURSINGINSTR_OBGYN_ALL_OB_FT
pt evaluated for c/o vaginal bleeding, on exam, no active bleeding, cervix long.  pt s/p Rhogam, plan pt with follow up with , d/c instructions given by [email or web address] np

## 2022-06-25 NOTE — OB PROVIDER TRIAGE NOTE - NSOBPROVIDERNOTE_OBGYN_ALL_OB_FT
36 y.o.  @ 14+4wga with vaginal bleeding  -No active vaginal bleeding on pelvic exam with normal ultrasound  -Rh negative - will give rhogam today  -Bleeding precautions given  -f/u in office on Monday 36 y.o.  @ 14+4wga with vaginal bleeding  -No active vaginal bleeding on pelvic exam with normal ultrasound  -Rh negative - will give rhogam today  -Bleeding precautions given  -f/u in office on Monday      Evaluation done by Dr. Vega   0653: Rhogam administered.   Plan d/w Dr. Brown   Patient can be discharged home at this time.   f/u with Dr. Almonte on Monday

## 2022-06-25 NOTE — OB PROVIDER TRIAGE NOTE - ADDITIONAL INSTRUCTIONS
Return to hospital for heavy vaginal bleeding, cramping, leaking of fluid.   Pelvic rest encouraged   Follow up with Dr. Almonte on Monday

## 2022-06-25 NOTE — OB PROVIDER TRIAGE NOTE - NSHPPHYSICALEXAM_GEN_ALL_CORE
Gen: No acute distress, sitting comfortably on exam table  : 5cc dark red blood in vaginal vault. No active bleeding from cervix. Cervix visually closed    TVUS: CL 3cm

## 2022-06-27 ENCOUNTER — APPOINTMENT (OUTPATIENT)
Dept: OBGYN | Facility: CLINIC | Age: 37
End: 2022-06-27

## 2022-06-27 VITALS — DIASTOLIC BLOOD PRESSURE: 70 MMHG | BODY MASS INDEX: 23.4 KG/M2 | WEIGHT: 145 LBS | SYSTOLIC BLOOD PRESSURE: 104 MMHG

## 2022-06-27 PROCEDURE — 0502F SUBSEQUENT PRENATAL CARE: CPT

## 2022-07-06 ENCOUNTER — APPOINTMENT (OUTPATIENT)
Dept: OBGYN | Facility: CLINIC | Age: 37
End: 2022-07-06

## 2022-07-06 PROCEDURE — 0502F SUBSEQUENT PRENATAL CARE: CPT

## 2022-07-12 ENCOUNTER — NON-APPOINTMENT (OUTPATIENT)
Age: 37
End: 2022-07-12

## 2022-07-12 ENCOUNTER — APPOINTMENT (OUTPATIENT)
Dept: DERMATOLOGY | Facility: CLINIC | Age: 37
End: 2022-07-12

## 2022-07-12 VITALS — WEIGHT: 145 LBS | BODY MASS INDEX: 23.3 KG/M2 | HEIGHT: 66 IN

## 2022-07-12 DIAGNOSIS — L73.0 ACNE KELOID: ICD-10-CM

## 2022-07-12 DIAGNOSIS — Z12.83 ENCOUNTER FOR SCREENING FOR MALIGNANT NEOPLASM OF SKIN: ICD-10-CM

## 2022-07-12 DIAGNOSIS — D22.9 MELANOCYTIC NEVI, UNSPECIFIED: ICD-10-CM

## 2022-07-12 DIAGNOSIS — L70.0 ACNE VULGARIS: ICD-10-CM

## 2022-07-12 DIAGNOSIS — D18.01 HEMANGIOMA OF SKIN AND SUBCUTANEOUS TISSUE: ICD-10-CM

## 2022-07-12 PROCEDURE — 99204 OFFICE O/P NEW MOD 45 MIN: CPT

## 2022-07-12 RX ORDER — AZELAIC ACID 0.15 G/G
15 GEL TOPICAL DAILY
Qty: 1 | Refills: 8 | Status: ACTIVE | COMMUNITY
Start: 2022-07-12 | End: 1900-01-01

## 2022-07-14 LAB
2ND TRIMESTER DATA: NORMAL
AFP PNL SERPL: NORMAL
AFP SERPL-ACNC: NORMAL
CLINICAL BIOCHEMIST REVIEW: NORMAL
NOTES NTD: NORMAL

## 2022-07-28 ENCOUNTER — APPOINTMENT (OUTPATIENT)
Dept: OBGYN | Facility: CLINIC | Age: 37
End: 2022-07-28

## 2022-07-28 ENCOUNTER — APPOINTMENT (OUTPATIENT)
Dept: ANTEPARTUM | Facility: CLINIC | Age: 37
End: 2022-07-28

## 2022-07-28 PROCEDURE — 0502F SUBSEQUENT PRENATAL CARE: CPT

## 2022-07-28 PROCEDURE — 76801 OB US < 14 WKS SINGLE FETUS: CPT

## 2022-08-01 ENCOUNTER — NON-APPOINTMENT (OUTPATIENT)
Age: 37
End: 2022-08-01

## 2022-08-02 ENCOUNTER — APPOINTMENT (OUTPATIENT)
Dept: DERMATOLOGY | Facility: CLINIC | Age: 37
End: 2022-08-02

## 2022-08-02 ENCOUNTER — LABORATORY RESULT (OUTPATIENT)
Age: 37
End: 2022-08-02

## 2022-08-02 DIAGNOSIS — L30.9 DERMATITIS, UNSPECIFIED: ICD-10-CM

## 2022-08-02 DIAGNOSIS — D48.5 NEOPLASM OF UNCERTAIN BEHAVIOR OF SKIN: ICD-10-CM

## 2022-08-02 PROCEDURE — 99213 OFFICE O/P EST LOW 20 MIN: CPT | Mod: 25

## 2022-08-02 PROCEDURE — 11102 TANGNTL BX SKIN SINGLE LES: CPT

## 2022-08-02 RX ORDER — TRIAMCINOLONE ACETONIDE 1 MG/G
0.1 OINTMENT TOPICAL
Qty: 1 | Refills: 1 | Status: ACTIVE | COMMUNITY
Start: 2022-08-02 | End: 1900-01-01

## 2022-08-16 ENCOUNTER — NON-APPOINTMENT (OUTPATIENT)
Age: 37
End: 2022-08-16

## 2022-08-24 ENCOUNTER — APPOINTMENT (OUTPATIENT)
Dept: OBGYN | Facility: CLINIC | Age: 37
End: 2022-08-24

## 2022-08-24 DIAGNOSIS — Z3A.23 23 WEEKS GESTATION OF PREGNANCY: ICD-10-CM

## 2022-08-24 PROCEDURE — 0502F SUBSEQUENT PRENATAL CARE: CPT

## 2022-08-24 PROCEDURE — 36415 COLL VENOUS BLD VENIPUNCTURE: CPT

## 2022-08-31 LAB
BASOPHILS # BLD AUTO: 0.05 K/UL
BASOPHILS NFR BLD AUTO: 0.5 %
EOSINOPHIL # BLD AUTO: 0.21 K/UL
EOSINOPHIL NFR BLD AUTO: 2.1 %
GLUCOSE 1H P 50 G GLC PO SERPL-MCNC: 133 MG/DL
HCT VFR BLD CALC: 37.8 %
HGB BLD-MCNC: 12.8 G/DL
IMM GRANULOCYTES NFR BLD AUTO: 1 %
LYMPHOCYTES # BLD AUTO: 1.72 K/UL
LYMPHOCYTES NFR BLD AUTO: 17.4 %
MAN DIFF?: NORMAL
MCHC RBC-ENTMCNC: 33.9 GM/DL
MCHC RBC-ENTMCNC: 35.1 PG
MCV RBC AUTO: 103.6 FL
MONOCYTES # BLD AUTO: 0.56 K/UL
MONOCYTES NFR BLD AUTO: 5.7 %
NEUTROPHILS # BLD AUTO: 7.27 K/UL
NEUTROPHILS NFR BLD AUTO: 73.3 %
PLATELET # BLD AUTO: 263 K/UL
RBC # BLD: 3.65 M/UL
RBC # FLD: 12.9 %
WBC # FLD AUTO: 9.91 K/UL

## 2022-09-14 ENCOUNTER — NON-APPOINTMENT (OUTPATIENT)
Age: 37
End: 2022-09-14

## 2022-09-21 ENCOUNTER — APPOINTMENT (OUTPATIENT)
Dept: ANTEPARTUM | Facility: CLINIC | Age: 37
End: 2022-09-21

## 2022-09-21 ENCOUNTER — APPOINTMENT (OUTPATIENT)
Dept: OBGYN | Facility: CLINIC | Age: 37
End: 2022-09-21

## 2022-09-21 PROCEDURE — 76816 OB US FOLLOW-UP PER FETUS: CPT

## 2022-09-21 PROCEDURE — 76819 FETAL BIOPHYS PROFIL W/O NST: CPT | Mod: 59

## 2022-09-21 PROCEDURE — 76817 TRANSVAGINAL US OBSTETRIC: CPT

## 2022-09-21 PROCEDURE — 0502F SUBSEQUENT PRENATAL CARE: CPT

## 2022-10-05 ENCOUNTER — APPOINTMENT (OUTPATIENT)
Dept: OBGYN | Facility: CLINIC | Age: 37
End: 2022-10-05

## 2022-10-05 ENCOUNTER — NON-APPOINTMENT (OUTPATIENT)
Age: 37
End: 2022-10-05

## 2022-10-05 VITALS
BODY MASS INDEX: 25.07 KG/M2 | WEIGHT: 156 LBS | HEIGHT: 66 IN | SYSTOLIC BLOOD PRESSURE: 109 MMHG | DIASTOLIC BLOOD PRESSURE: 74 MMHG

## 2022-10-05 PROCEDURE — 0502F SUBSEQUENT PRENATAL CARE: CPT

## 2022-10-05 RX ORDER — HUMAN RHO(D) IMMUNE GLOBULIN 300 UG/1
1500 INJECTION, SOLUTION INTRAMUSCULAR
Refills: 0 | Status: COMPLETED | OUTPATIENT
Start: 2022-10-05

## 2022-10-19 ENCOUNTER — APPOINTMENT (OUTPATIENT)
Dept: OBGYN | Facility: CLINIC | Age: 37
End: 2022-10-19

## 2022-10-19 ENCOUNTER — MED ADMIN CHARGE (OUTPATIENT)
Age: 37
End: 2022-10-19

## 2022-10-19 ENCOUNTER — APPOINTMENT (OUTPATIENT)
Dept: ANTEPARTUM | Facility: CLINIC | Age: 37
End: 2022-10-19

## 2022-10-19 VITALS — DIASTOLIC BLOOD PRESSURE: 79 MMHG | BODY MASS INDEX: 25.82 KG/M2 | SYSTOLIC BLOOD PRESSURE: 120 MMHG | WEIGHT: 160 LBS

## 2022-10-19 PROCEDURE — 0502F SUBSEQUENT PRENATAL CARE: CPT

## 2022-10-19 PROCEDURE — 90471 IMMUNIZATION ADMIN: CPT

## 2022-10-19 PROCEDURE — 90686 IIV4 VACC NO PRSV 0.5 ML IM: CPT

## 2022-10-31 ENCOUNTER — TRANSCRIPTION ENCOUNTER (OUTPATIENT)
Age: 37
End: 2022-10-31

## 2022-10-31 ENCOUNTER — NON-APPOINTMENT (OUTPATIENT)
Age: 37
End: 2022-10-31

## 2022-10-31 ENCOUNTER — APPOINTMENT (OUTPATIENT)
Dept: OBGYN | Facility: CLINIC | Age: 37
End: 2022-10-31

## 2022-10-31 VITALS
WEIGHT: 161 LBS | HEIGHT: 66 IN | BODY MASS INDEX: 25.88 KG/M2 | DIASTOLIC BLOOD PRESSURE: 75 MMHG | SYSTOLIC BLOOD PRESSURE: 109 MMHG

## 2022-10-31 PROCEDURE — ZZZZZ: CPT

## 2022-11-14 ENCOUNTER — APPOINTMENT (OUTPATIENT)
Dept: OBGYN | Facility: CLINIC | Age: 37
End: 2022-11-14

## 2022-11-14 VITALS
WEIGHT: 166 LBS | DIASTOLIC BLOOD PRESSURE: 71 MMHG | SYSTOLIC BLOOD PRESSURE: 113 MMHG | BODY MASS INDEX: 26.68 KG/M2 | HEIGHT: 66 IN

## 2022-11-14 PROCEDURE — 0502F SUBSEQUENT PRENATAL CARE: CPT

## 2022-11-19 ENCOUNTER — NON-APPOINTMENT (OUTPATIENT)
Age: 37
End: 2022-11-19

## 2022-11-21 ENCOUNTER — APPOINTMENT (OUTPATIENT)
Dept: ANTEPARTUM | Facility: CLINIC | Age: 37
End: 2022-11-21

## 2022-11-21 ENCOUNTER — TRANSCRIPTION ENCOUNTER (OUTPATIENT)
Age: 37
End: 2022-11-21

## 2022-11-21 ENCOUNTER — INPATIENT (INPATIENT)
Facility: HOSPITAL | Age: 37
LOS: 0 days | Discharge: HOME | End: 2022-11-22
Attending: OBSTETRICS & GYNECOLOGY | Admitting: OBSTETRICS & GYNECOLOGY

## 2022-11-21 ENCOUNTER — ASOB RESULT (OUTPATIENT)
Age: 37
End: 2022-11-21

## 2022-11-21 VITALS — HEART RATE: 70 BPM | SYSTOLIC BLOOD PRESSURE: 118 MMHG | DIASTOLIC BLOOD PRESSURE: 77 MMHG

## 2022-11-21 DIAGNOSIS — O26.899 OTHER SPECIFIED PREGNANCY RELATED CONDITIONS, UNSPECIFIED TRIMESTER: ICD-10-CM

## 2022-11-21 DIAGNOSIS — Z98.890 OTHER SPECIFIED POSTPROCEDURAL STATES: Chronic | ICD-10-CM

## 2022-11-21 DIAGNOSIS — O9A.213 INJURY, POISONING AND CERTAIN OTHER CONSEQUENCES OF EXTERNAL CAUSES COMPLICATING PREGNANCY, THIRD TRIMESTER: ICD-10-CM

## 2022-11-21 DIAGNOSIS — Z3A.00 WEEKS OF GESTATION OF PREGNANCY NOT SPECIFIED: ICD-10-CM

## 2022-11-21 LAB
APPEARANCE UR: ABNORMAL
APTT BLD: 25.1 SEC — LOW (ref 27–36.3)
BACTERIA # UR AUTO: ABNORMAL
BASOPHILS # BLD AUTO: 0.04 K/UL — SIGNIFICANT CHANGE UP (ref 0–0.2)
BASOPHILS NFR BLD AUTO: 0.5 % — SIGNIFICANT CHANGE UP (ref 0–2)
BILIRUB UR-MCNC: NEGATIVE — SIGNIFICANT CHANGE UP
COLOR SPEC: SIGNIFICANT CHANGE UP
COVID-19 SPIKE DOMAIN AB INTERP: POSITIVE
COVID-19 SPIKE DOMAIN ANTIBODY RESULT: 206 U/ML — HIGH
DIFF PNL FLD: NEGATIVE — SIGNIFICANT CHANGE UP
EOSINOPHIL # BLD AUTO: 0.45 K/UL — SIGNIFICANT CHANGE UP (ref 0–0.5)
EOSINOPHIL NFR BLD AUTO: 5.6 % — SIGNIFICANT CHANGE UP (ref 0–6)
EPI CELLS # UR: 8 /HPF — HIGH (ref 0–5)
FIBRINOGEN PPP-MCNC: 375 MG/DL — SIGNIFICANT CHANGE UP (ref 200–465)
GLUCOSE UR QL: NEGATIVE — SIGNIFICANT CHANGE UP
HCT VFR BLD CALC: 35 % — SIGNIFICANT CHANGE UP (ref 34.5–45)
HGB BLD-MCNC: 12.6 G/DL — SIGNIFICANT CHANGE UP (ref 11.5–15.5)
HYALINE CASTS # UR AUTO: 5 /LPF — SIGNIFICANT CHANGE UP (ref 0–7)
IANC: 4.58 K/UL — SIGNIFICANT CHANGE UP (ref 1.8–7.4)
IMM GRANULOCYTES NFR BLD AUTO: 0.6 % — SIGNIFICANT CHANGE UP (ref 0–0.9)
INR BLD: <0.9 RATIO — SIGNIFICANT CHANGE UP (ref 0.88–1.16)
KETONES UR-MCNC: NEGATIVE — SIGNIFICANT CHANGE UP
KLEIHAUER-BETKE CALCULATION: 0.07 % — SIGNIFICANT CHANGE UP
LEUKOCYTE ESTERASE UR-ACNC: ABNORMAL
LYMPHOCYTES # BLD AUTO: 2.24 K/UL — SIGNIFICANT CHANGE UP (ref 1–3.3)
LYMPHOCYTES # BLD AUTO: 27.9 % — SIGNIFICANT CHANGE UP (ref 13–44)
MCHC RBC-ENTMCNC: 34.8 PG — HIGH (ref 27–34)
MCHC RBC-ENTMCNC: 36 GM/DL — SIGNIFICANT CHANGE UP (ref 32–36)
MCV RBC AUTO: 96.7 FL — SIGNIFICANT CHANGE UP (ref 80–100)
MONOCYTES # BLD AUTO: 0.67 K/UL — SIGNIFICANT CHANGE UP (ref 0–0.9)
MONOCYTES NFR BLD AUTO: 8.3 % — SIGNIFICANT CHANGE UP (ref 2–14)
NEUTROPHILS # BLD AUTO: 4.58 K/UL — SIGNIFICANT CHANGE UP (ref 1.8–7.4)
NEUTROPHILS NFR BLD AUTO: 57.1 % — SIGNIFICANT CHANGE UP (ref 43–77)
NITRITE UR-MCNC: NEGATIVE — SIGNIFICANT CHANGE UP
NRBC # BLD: 0 /100 WBCS — SIGNIFICANT CHANGE UP (ref 0–0)
NRBC # FLD: 0 K/UL — SIGNIFICANT CHANGE UP (ref 0–0)
PH UR: 6.5 — SIGNIFICANT CHANGE UP (ref 5–8)
PLATELET # BLD AUTO: 235 K/UL — SIGNIFICANT CHANGE UP (ref 150–400)
PROT UR-MCNC: ABNORMAL
PROTHROM AB SERPL-ACNC: 10.2 SEC — LOW (ref 10.5–13.4)
RBC # BLD: 3.62 M/UL — LOW (ref 3.8–5.2)
RBC # FLD: 12.1 % — SIGNIFICANT CHANGE UP (ref 10.3–14.5)
RBC CASTS # UR COMP ASSIST: 2 /HPF — SIGNIFICANT CHANGE UP (ref 0–4)
RH IG SCN BLD-IMP: NEGATIVE — SIGNIFICANT CHANGE UP
SARS-COV-2 IGG+IGM SERPL QL IA: 206 U/ML — HIGH
SARS-COV-2 IGG+IGM SERPL QL IA: POSITIVE
SARS-COV-2 RNA SPEC QL NAA+PROBE: SIGNIFICANT CHANGE UP
SP GR SPEC: 1.02 — SIGNIFICANT CHANGE UP (ref 1.01–1.05)
T PALLIDUM AB TITR SER: NEGATIVE — SIGNIFICANT CHANGE UP
UROBILINOGEN FLD QL: SIGNIFICANT CHANGE UP
WBC # BLD: 8.03 K/UL — SIGNIFICANT CHANGE UP (ref 3.8–10.5)
WBC # FLD AUTO: 8.03 K/UL — SIGNIFICANT CHANGE UP (ref 3.8–10.5)
WBC UR QL: 92 /HPF — HIGH (ref 0–5)

## 2022-11-21 PROCEDURE — 73070 X-RAY EXAM OF ELBOW: CPT | Mod: 26,LT,77

## 2022-11-21 PROCEDURE — 76815 OB US LIMITED FETUS(S): CPT | Mod: 26

## 2022-11-21 PROCEDURE — 86077 PHYS BLOOD BANK SERV XMATCH: CPT

## 2022-11-21 PROCEDURE — 73090 X-RAY EXAM OF FOREARM: CPT | Mod: 26,LT

## 2022-11-21 PROCEDURE — 76818 FETAL BIOPHYS PROFILE W/NST: CPT | Mod: 26

## 2022-11-21 PROCEDURE — 73070 X-RAY EXAM OF ELBOW: CPT | Mod: 26,LT

## 2022-11-21 PROCEDURE — 73060 X-RAY EXAM OF HUMERUS: CPT | Mod: 26,LT

## 2022-11-21 PROCEDURE — 76819 FETAL BIOPHYS PROFIL W/O NST: CPT | Mod: 26

## 2022-11-21 PROCEDURE — 73030 X-RAY EXAM OF SHOULDER: CPT | Mod: 26,LT

## 2022-11-21 RX ORDER — SODIUM CHLORIDE 9 MG/ML
1000 INJECTION, SOLUTION INTRAVENOUS
Refills: 0 | Status: DISCONTINUED | OUTPATIENT
Start: 2022-11-21 | End: 2022-11-21

## 2022-11-21 NOTE — PROGRESS NOTE ADULT - SUBJECTIVE AND OBJECTIVE BOX
Patient seen at bedside  States she is feeling well, though she feels mild cramping in the lower right side.  Patient states elbow  and sore    VSS  Abdomen: soft, NT  Left Elbow: generalized tenderness  LE: NT    EFM: Cat1  CNX: low amplitude contractions q5-8    Plan  Patient to continue montoring for 24 hours done at 3am  X-ray for left upper extremity  Discussed with Dr. Almonte

## 2022-11-21 NOTE — OB PROVIDER TRIAGE NOTE - NSOBPROVIDERNOTE_OBGYN_ALL_OB_FT
- fetal movement felt by patient in triage, fetal movement seen on abdominal sonogram and patient verbalizes seeing fetal movement on sonogram  - CBC, coags, KB labs and UA sent  - cervix closed, no vaginal bleeding, no evidence of PPROM - fetal movement felt by patient in triage, fetal movement seen on abdominal sonogram and patient verbalizes seeing fetal movement on sonogram  - No abdominal pain  -   - CBC, coags, KB labs and UA sent  - Cervix closed, no vaginal bleeding, no evidence of PPROM  - Reactive NST, regular contractions q3 minutes  - admit for 24 hour observation d/t   -  Vertex presentation  - Admission labs done  - Consent form signed  - Covid swab done  - Case discussed with Dr. Andrade 35 y/o  at 35.6 weeks evaluated s/p fall @1am and decreased fetal movement  - Fetal movement felt by patient in triage, fetal movement seen on abdominal sonogram and patient verbalizes seeing fetal movement on sonogram  - No abdominal trauma as per patient, no abdominal pain  - Patient able to move elbow, no X ray needed as per Dr. Andrade  - Cervix closed, no vaginal bleeding, no evidence of PPROM  - Reactive NST, regular contractions q3 minutes, patient reports not feeling contractions  - Admit for 24 hour observation d/t contractions  - CBC, coags, KB, UA and admission labs sent  - Vertex presentation  - Admission labs done  - Consent form signed  - Covid swab done  - Case discussed with Dr. Andrade 35 y/o  at 35.6 weeks evaluated s/p fall @1am and decreased fetal movement  - Fetal movement felt by patient in triage, fetal movement seen on abdominal sonogram and patient verbalizes seeing fetal movement on sonogram  - No abdominal trauma as per patient, no abdominal pain  - Patient able to move elbow, no X ray needed as per Dr. Andrade  - Cervix closed, no vaginal bleeding, no evidence of PPROM  - Reactive NST, regular contractions q3 minutes, patient reports not feeling contractions  - Admit for 24 hour observation d/t contractions  - CBC, coags, KB, UA and admission labs sent  - Vertex presentation  - Admission labs done  - Blood transfusion consent form signed  - Covid swab done  - Case discussed with Dr. Andrade and Dr. Juliet Martin 37 y/o  at 35.6 weeks evaluated s/p fall @1am and decreased fetal movement  - Fetal movement felt by patient in triage, fetal movement seen on abdominal sonogram and patient verbalizes seeing fetal movement on sonogram  - No abdominal trauma as per patient, no abdominal pain  - Patient able to move wrist and elbow, no X ray needed as per Dr. Andrade  - Cervix closed, no vaginal bleeding, no evidence of PPROM  - Reactive NST, regular contractions q3 minutes, patient reports not feeling contractions  - Admit for 24 hour observation d/t contractions  - CBC, coags, KB, UA and admission labs sent  - Vertex presentation  - Admission labs done  - Blood transfusion consent form signed  - Covid swab done  - Case discussed with Dr. Andrade and Dr. Juliet Martin PGY-3

## 2022-11-21 NOTE — OB PROVIDER TRIAGE NOTE - NSHPLABSRESULTS_GEN_ALL_CORE
CBC Full  -  ( 21 Nov 2022 02:50 )  WBC Count : 8.03 K/uL  RBC Count : 3.62 M/uL  Hemoglobin : 12.6 g/dL  Hematocrit : 35.0 %  Platelet Count - Automated : 235 K/uL  Mean Cell Volume : 96.7 fL  Mean Cell Hemoglobin : 34.8 pg  Mean Cell Hemoglobin Concentration : 36.0 gm/dL  Auto Neutrophil # : 4.58 K/uL  Auto Lymphocyte # : 2.24 K/uL  Auto Monocyte # : 0.67 K/uL  Auto Eosinophil # : 0.45 K/uL  Auto Basophil # : 0.04 K/uL  Auto Neutrophil % : 57.1 %  Auto Lymphocyte % : 27.9 %  Auto Monocyte % : 8.3 %  Auto Eosinophil % : 5.6 %  Auto Basophil % : 0.5 %    PT/INR - ( 21 Nov 2022 02:50 )   PT: 10.2 sec;   INR: <0.90 ratio         PTT - ( 21 Nov 2022 02:50 )  PTT:25.1 sec

## 2022-11-21 NOTE — OB PROVIDER H&P - HISTORY OF PRESENT ILLNESS
37 y/o  at 35.6 weeks presents s/p fall at 1am. Patient states she was using a pillow for her legs and tripped over it after standing up. Patient reports she fell onto left arm, reports elbow pain, 6-7/10 in severity, still able to move elbow. Denies any abdominal trauma, no abdominal pain. Reports decreased fetal movement after the fall. Denies vaginal bleeding, leakage of fluid, contractions.   Patient had a placenta previa which resolved at 23-24 weeks, had episode of vaginal bleeding at 14 weeks s/p RhoGam. Had second dose of Rhogam on 10/5. Denies any other AP issues or fetal issues.   Blood type A -     Med Hx: denies   Meds: PNV, folic acid,   OBGYN Hx: blighted ovum s/p D&C   uterine polypectomy   denies hx of fibroids, ovarian cysts, abnormal pap smear, STDs  Surg Hx: uterine polypectomy , D&C   Allergies: NKDA

## 2022-11-21 NOTE — OB RN TRIAGE NOTE - FALL HARM RISK - RISK INTERVENTIONS

## 2022-11-21 NOTE — OB PROVIDER TRIAGE NOTE - NSHPPHYSICALEXAM_GEN_ALL_CORE
VS  T(C): 36.3 (11-21-22 @ 02:29)  HR: 70 (11-21-22 @ 02:29)  BP: 118/77 (11-21-22 @ 02:29)  RR: 15 (11-21-22 @ 02:29)  SpO2: --    A&O x 3  Lungs: CTAB  Abd: gravid, soft nontender to palpation  EFM: baseline, moderate variability, + accels, no decels, ctx q3 minutes  SSE: no pooling, leukorrhea noted, negative nitrazine, negative fern  SVE: 0/30/-3  TAS: vertex presentation, posterior placenta, BPP 8/8 DAYA 13.06 VS  T(C): 36.3 (11-21-22 @ 02:29)  HR: 70 (11-21-22 @ 02:29)  BP: 118/77 (11-21-22 @ 02:29)  RR: 15 (11-21-22 @ 02:29)  SpO2: --    A&O x 3  Lungs: CTAB  Abd: gravid, soft nontender to palpation  Extremities: mild left elbow tenderness to palpation, able to move elbow and wrist  EFM: baseline 145, moderate variability, + accels, no decels, ctx q3 minutes  SSE: cervix appears closed, leukorrhea noted, negative nitrazine, negative fern  SVE: 0/30/-3  TAS: vertex presentation, posterior placenta, BPP 8/8 DAYA 13.06 VS  T(C): 36.3 (11-21-22 @ 02:29)  HR: 70 (11-21-22 @ 02:29)  BP: 118/77 (11-21-22 @ 02:29)  RR: 15 (11-21-22 @ 02:29)  SpO2: --    A&O x 3  Lungs: CTAB  Abd: gravid, soft nontender to palpation  Extremities: mild tenderness to palpation of left elbow, able to move elbow and wrist  EFM: baseline 145, moderate variability, + accels, no decels, ctx q3 minutes  SSE: cervix appears closed, leukorrhea noted, negative nitrazine, negative fern  SVE: 0/30/-3  TAS: vertex presentation, posterior placenta, BPP 8/8 DAYA 13.06

## 2022-11-21 NOTE — OB PROVIDER H&P - NSHPPHYSICALEXAM_GEN_ALL_CORE
VS  T(C): 36.3 (11-21-22 @ 02:29)  HR: 70 (11-21-22 @ 02:29)  BP: 118/77 (11-21-22 @ 02:29)  RR: 15 (11-21-22 @ 02:29)  SpO2: --    A&O x 3  Lungs: CTAB  Abd: gravid, soft nontender to palpation  Extremities: mild tenderness to palpation of left elbow, able to move elbow and wrist  EFM: baseline 145, moderate variability, + accels, no decels, ctx q3 minutes  SSE: cervix appears closed, leukorrhea noted, negative nitrazine, negative fern  SVE: 0/30/-3  TAS: vertex presentation, posterior placenta, BPP 8/8 DAYA 13.06

## 2022-11-21 NOTE — CONSULT NOTE ADULT - ASSESSMENT
Assessment and Plan     36y year old Female  with ?L radial head fracture    - Multimodal Pain Control  - Ice, Elevation  - FU [place] views    ****incomplete note***   Assessment and Plan     36y year old Female  with suspected L radial head fracture    - Multimodal Pain Control  - Ice, Elevation  - FU [place]ldon views    ****incomplete note***   Assessment and Plan     36y year old Female with questionable non-displaced L radial head fx.  - WBAT LUE in sling for comfort  - Multimodal Pain Control  - Ice, Elevation  - FU outpt with Dr. Hayden, Dr. Lewis, or Dr. Nation in 1 week  - Remainder of care per primary team

## 2022-11-21 NOTE — OB PROVIDER H&P - PROBLEM SELECTOR PROBLEM 1
Injury, poisoning and certain other consequences of external causes complicating pregnancy, third trimester

## 2022-11-21 NOTE — OB PROVIDER TRIAGE NOTE - HISTORY OF PRESENT ILLNESS
35.6 weeks presents s/p fall at 1am. Patient states she was using a pillow for her legs and tripped over it after standing up. Patient reports she fell onto an outstretched left arm and heard a crack. Denies any abdominal trauma, no abdominal pain. Reports decreased fetal movement after the fall. Denies vaginal bleeding, leakage of fluid.   Patient had a placenta previa which resolved at 23 weeks, had episode of vaginal bleeding at 14 weeks s/o RhoGam. Had Second Rhogam at ***   Blood type A -   Med Hx:  Meds: PNV, folic acid,   OBGYN Hx: blighted ovum s/p D&C   - uterine polypectomy 2012  denies hx of fibroids, ovarian cysts, abnormal pap smear, STDs  Surg Hx: uterine polypectomy 2012, D&C   Allergies: NKDA 35.6 weeks presents s/p fall at 1am. Patient states she was using a pillow for her legs and tripped over it after standing up. Patient reports she fell onto an outstretched left hand and heard a crack. Reports elbow pain, 6-7/10 in severity. Denies any abdominal trauma, no abdominal pain. Reports decreased fetal movement after the fall. Denies vaginal bleeding, leakage of fluid.   Patient had a placenta previa which resolved at 23 weeks, had episode of vaginal bleeding at 14 weeks s/o RhoGam. Had Second Rhogam at ***   Blood type A -   Med Hx:  Meds: PNV, folic acid,   OBGYN Hx: blighted ovum s/p D&C   - uterine polypectomy 2012  denies hx of fibroids, ovarian cysts, abnormal pap smear, STDs  Surg Hx: uterine polypectomy 2012, D&C   Allergies: NKDA 35.6 weeks presents s/p fall at 1am. Patient states she was using a pillow for her legs and tripped over it after standing up. Patient reports she fell onto an outstretched left hand and heard a crack. Reports elbow pain, 6-7/10 in severity. Denies any abdominal trauma, no abdominal pain. Reports decreased fetal movement after the fall. Denies vaginal bleeding, leakage of fluid.   Patient had a placenta previa which resolved at 23 weeks, had episode of vaginal bleeding at 14 weeks s/o RhoGam. Had Second Rhogam at ***   Blood type A -     Med Hx:  Meds: PNV, folic acid,   OBGYN Hx: blighted ovum s/p D&C   - uterine polypectomy 2012  denies hx of fibroids, ovarian cysts, abnormal pap smear, STDs  Surg Hx: uterine polypectomy 2012, D&C   Allergies: NKDA 35.6 weeks presents s/p fall at 1am. Patient states she was using a pillow for her legs and tripped over it after standing up. Patient reports she fell onto an outstretched left hand and heard a crack. Reports elbow pain, 6-7/10 in severity. Is able to move left elbow. Denies any abdominal trauma, no abdominal pain. Reports decreased fetal movement after the fall. Denies vaginal bleeding, leakage of fluid, contractions.   Patient had a placenta previa which resolved at 23-24 weeks, had episode of vaginal bleeding at 14 weeks s/o RhoGam. Had Second dose of Rhogam 10/5. Denies any other AP issues or fetal issues.   Blood type A -     Med Hx: denies   Meds: PNV, folic acid,   OBGYN Hx: blighted ovum s/p D&C   - uterine polypectomy 2012  denies hx of fibroids, ovarian cysts, abnormal pap smear, STDs  Surg Hx: uterine polypectomy 2012, D&C   Allergies: NKDA 37 y/o  at 35.6 weeks presents s/p fall at 1am. Patient states she was using a pillow for her legs and tripped over it after standing up. Patient reports she fell onto left arm, reports elbow pain, 6-7/10 in severity, is able to move left elbow. Denies any abdominal trauma, no abdominal pain. Reports decreased fetal movement after the fall. Denies vaginal bleeding, leakage of fluid, contractions.   Patient had a placenta previa which resolved at 23-24 weeks, had episode of vaginal bleeding at 14 weeks s/p RhoGam. Had second dose of Rhogam on 10/5. Denies any other AP issues or fetal issues.   Blood type A -     Med Hx: denies   Meds: PNV, folic acid,   OBGYN Hx: blighted ovum s/p D&C   uterine polypectomy   denies hx of fibroids, ovarian cysts, abnormal pap smear, STDs  Surg Hx: uterine polypectomy , D&C   Allergies: NKDA 37 y/o  at 35.6 weeks presents s/p fall at 1am. Patient states she was using a pillow for her legs and tripped over it after standing up. Patient reports she fell onto left arm, reports elbow pain, 6-7/10 in severity, still able to move elbow. Denies any abdominal trauma, no abdominal pain. Reports decreased fetal movement after the fall. Denies vaginal bleeding, leakage of fluid, contractions.   Patient had a placenta previa which resolved at 23-24 weeks, had episode of vaginal bleeding at 14 weeks s/p RhoGam. Had second dose of Rhogam on 10/5. Denies any other AP issues or fetal issues.   Blood type A -     Med Hx: denies   Meds: PNV, folic acid,   OBGYN Hx: blighted ovum s/p D&C   uterine polypectomy   denies hx of fibroids, ovarian cysts, abnormal pap smear, STDs  Surg Hx: uterine polypectomy , D&C   Allergies: NKDA

## 2022-11-21 NOTE — OB RN PATIENT PROFILE - FALL HARM RISK - RISK INTERVENTIONS

## 2022-11-21 NOTE — OB RN TRIAGE NOTE - CHIEF COMPLAINT QUOTE
I fell at 0100, no direct trauma to the belly, I used by left arm to break the fall.  I am feeling less movement since the fall.

## 2022-11-21 NOTE — CONSULT NOTE ADULT - SUBJECTIVE AND OBJECTIVE BOX
HPI:   36y year old 35 week pregnant female seen s/p mechanical fall this morning in which she landed on her left outstretched hand.  Patient states she felt a "pop" in her elbow when she landed and immediate pain. Patient denies pain in any other joint, numbness, tingling, paresthesias. No pain in any other extremities.  No Head trauma or LOC.    PMH/PSH:  PAST MEDICAL & SURGICAL HISTORY:  No pertinent past medical history      History of dilatation and curettage  polypectomy 2012          Allergies:  No Known Allergies      O:  T(C): 36.4 (11-21-22 @ 13:56), Max: 36.9 (11-21-22 @ 03:58)  HR: 74 (11-21-22 @ 13:56) (69 - 74)  BP: 105/63 (11-21-22 @ 13:56) (105/63 - 126/83)  RR: 15 (11-21-22 @ 03:58) (15 - 15)  SpO2: --    Gen: NAD  Resp: Non-labored breathing  LUE   Full AROM with no TTP over radial head  Skin intact with no visible deformity of elbow  SILT med/rad/ulnar/axillary  +AIN/PIN/Med/Ulnar  Radial pulse palpable, WWP distally      Imaging:  XR L elbow demonstrating a moderate elbow joint effusion. Curvilinear lucency within the   radial head/neck on the oblique view, which may represent a nondisplaced   fracture. Cortical irregularity along the lateral aspect of the   capitellum, which may represent small chip fracture. No dislocation.          Assessment and Plan     36y year old Female  with ?L radial head fracture    - Multimodal Pain Control  - Ice, Elevation  - FU imaging     ****incomplete note***        HPI:   36y year old 35 week pregnant female seen s/p mechanical fall this morning in which she landed on her left outstretched hand.  Patient states she felt a "pop" in her elbow when she landed and immediate pain. Patient denies pain in any other joint, numbness, tingling, paresthesias. No pain in any other extremities.  No Head trauma or LOC.    PMH/PSH:  PAST MEDICAL & SURGICAL HISTORY:  No pertinent past medical history      History of dilatation and curettage  polypectomy 2012          Allergies:  No Known Allergies      O:  T(C): 36.4 (11-21-22 @ 13:56), Max: 36.9 (11-21-22 @ 03:58)  HR: 74 (11-21-22 @ 13:56) (69 - 74)  BP: 105/63 (11-21-22 @ 13:56) (105/63 - 126/83)  RR: 15 (11-21-22 @ 03:58) (15 - 15)  SpO2: --    Gen: NAD  Resp: Non-labored breathing  LUE   Full AROM with no TTP over radial head  Skin intact with no visible deformity of elbow  SILT med/rad/ulnar/axillary  +AIN/PIN/Med/Ulnar  Radial pulse palpable, WWP distally      Imaging:  XR L elbow demonstrating a moderate elbow joint effusion. Curvilinear lucency within the   radial head/neck on the oblique view, which may represent a nondisplaced   fracture. Cortical irregularity along the lateral aspect of the   capitellum, which may represent small chip fracture. No dislocation.              HPI:   36y year old 35 week pregnant female seen s/p mechanical fall this morning in which she landed on her left outstretched hand.  Patient states she felt a "pop" in her elbow when she landed and immediate pain. Patient denies pain in any other joint, numbness, tingling, paresthesias. No pain in any other extremities.  No Head trauma or LOC.    PMH/PSH:  PAST MEDICAL & SURGICAL HISTORY:  No pertinent past medical history      History of dilatation and curettage  polypectomy 2012      Allergies:  No Known Allergies      O:  T(C): 36.4 (11-21-22 @ 13:56), Max: 36.9 (11-21-22 @ 03:58)  HR: 74 (11-21-22 @ 13:56) (69 - 74)  BP: 105/63 (11-21-22 @ 13:56) (105/63 - 126/83)  RR: 15 (11-21-22 @ 03:58) (15 - 15)  SpO2: --    Gen: NAD  Resp: Non-labored breathing  LUE   Full AROM (flexion/extension/pronation/supination) with no TTP over radial head or mechanical blocks to motion  Skin intact with no visible deformity of elbow  SILT med/rad/ulnar/axillary  +AIN/PIN/Med/Ulnar  Radial pulse palpable, WWP distally      Imaging:  XR L elbow demonstrating a moderate elbow joint effusion. Curvilinear lucency within the   radial head/neck on the oblique view, which may represent a nondisplaced   fracture. Cortical irregularity along the lateral aspect of the   capitellum, which may represent small chip fracture. No dislocation.

## 2022-11-21 NOTE — OB RN PATIENT PROFILE - NAME OF FATHER, OB PROFILE
Fever greater than (need to indicate Fahrenheit or Celsius)/Inability to tolerate liquids or foods/Wound/Surgical Site with redness, or foul smelling discharge or pus/Bleeding that does not stop Manuel

## 2022-11-21 NOTE — OB PROVIDER H&P - ASSESSMENT
35 y/o  at 35.6 weeks evaluated s/p fall @1am and decreased fetal movement  - Fetal movement felt by patient in triage, fetal movement seen on abdominal sonogram and patient verbalizes seeing fetal movement on sonogram  - No abdominal trauma as per patient, no abdominal pain  - Patient able to move wrist and elbow, no X ray needed as per Dr. Andrade  - Cervix closed, no vaginal bleeding, no evidence of PPROM  - Reactive NST, regular contractions q3 minutes, patient reports not feeling contractions  - Admit for 24 hour observation d/t contractions  - CBC, coags, KB, UA and admission labs sent  - Vertex presentation  - Admission labs done  - Blood transfusion consent form signed  - Covid swab done  - Case discussed with Dr. Andrade and Dr. Juliet Martin PGY-3

## 2022-11-21 NOTE — OB RN TRIAGE NOTE - HOW OFTEN DO YOU HAVE A DRINK CONTAINING ALCOHOL?
Anesthesia ROS/Med Hx        Pulmonary Review:  Pulmonary Comments: Suspected sleep apnea  PEREZ  Quit smoking 2 months ago  Pt. positive for sleep apnea   Pt. positive for shortness of breath  The patient is a former smoker with a 20 pack-year history.     Neuro/Psych Review:    Pt. positive for psychiatric history    Cardiovascular Review:    Pt. positive for hypertension  Pt. positive for hyperlipidemia    GI/HEPATIC/RENAL Review:  GI/Hepatic/Renal Review Comments: Abdominal pain    End/Other Review:    Pt. positive for obesity class I - 30.00 - 34.99  Overall Review of Systems Comments:  H/o \"violent emergence\" after last two tonsil surgeries.      Anesthesia Plan     ASA Status: 2  Anesthesia Type: MAC  Reviewed: Lab Results, Medications, Problem List, Past Med History, Allergies, Beta Blocker Status, Patient Summary and NPO Status  The proposed anesthetic plan, including its risks and benefits, have been discussed with the Patient - along with the risks and benefits of alternatives.  Questions were encouraged and answered and the patient and/or representative agrees to proceed.  Plan Comments: MAC  RT for Bipap.      Physical Exam  Mallampati: II  TM Distance: >3 FB  Cardio Rhythm: Regular  Cardio Rate: Normal  Breath sounds clear to auscultation:  Yes  Patient Demonstrates:  Obese  dental exam normal                  
Never

## 2022-11-22 VITALS — SYSTOLIC BLOOD PRESSURE: 127 MMHG | HEART RATE: 73 BPM | DIASTOLIC BLOOD PRESSURE: 72 MMHG

## 2022-11-22 VITALS — TEMPERATURE: 98 F

## 2022-11-22 DIAGNOSIS — Z04.9 ENCOUNTER FOR EXAMINATION AND OBSERVATION FOR UNSPECIFIED REASON: ICD-10-CM

## 2022-11-22 LAB
GROUP B BETA STREP DNA (PCR): SIGNIFICANT CHANGE UP
SOURCE GROUP B STREP: SIGNIFICANT CHANGE UP

## 2022-11-22 NOTE — DISCHARGE NOTE ANTEPARTUM - MEDICATION SUMMARY - MEDICATIONS TO TAKE
I will START or STAY ON the medications listed below when I get home from the hospital:    folic acid  -- Indication: For vitamins    fish oil  -- Indication: For vitamins    PNV Prenatal oral tablet  -- 1 tab(s) by mouth once a day  -- Indication: For vitamins

## 2022-11-22 NOTE — DISCHARGE NOTE ANTEPARTUM - PLAN OF CARE
Follow up with orthopedics as recommended for further management. Continue Tylenol as needed for any pain.    Follow up with your OBGYN as scheduled for routine prenatal care. Return to Labor and Delivery if you experience any decreased fetal movement, leakage of fluid, vaginal bleeding, or painful and regular contractions.

## 2022-11-22 NOTE — DISCHARGE NOTE ANTEPARTUM - CARE PLAN
1 Principal Discharge DX:	Injury, poisoning and certain other consequences of external causes complicating pregnancy, third trimester  Assessment and plan of treatment:	Follow up with orthopedics as recommended for further management. Continue Tylenol as needed for any pain.    Follow up with your OBGYN as scheduled for routine prenatal care. Return to Labor and Delivery if you experience any decreased fetal movement, leakage of fluid, vaginal bleeding, or painful and regular contractions.

## 2022-11-22 NOTE — DISCHARGE NOTE ANTEPARTUM - PATIENT PORTAL LINK FT
You can access the FollowMyHealth Patient Portal offered by [facility] by registering at the following website: [email or web address]. By joining [employer]’s FollowMyHealth portal, you will also be able to view your health information using other applications (apps) compatible with our system.

## 2022-11-22 NOTE — DISCHARGE NOTE ANTEPARTUM - HOSPITAL COURSE
Patient presented for feeling decreased fetal movement after fall. On presentation to triage, patient noted to be jessica on tocometer with intermittently category 2 FHT. Patient was admitted for 24 hours observation after fall. Ortho was consulted to evaluate patient's left hand on which she fell. Imaging revealed non-displaced left radial head fracture, for which she received a splint. Orthopedic recommendations included outpatient followup. During patient's admission, fetal status remained reassuring. Patient was discharged on HD#2 in stable condition with instruction to follow up with OBGYN and orthopedics outpatient.

## 2022-11-22 NOTE — DISCHARGE NOTE ANTEPARTUM - CARE PROVIDER_API CALL
Surya Almonte)  MaternalFetal Medicine; Obstetrics and Gynecology  [address] [place] [id number]  Phone: [phone]  Fax: [phone]  Follow Up Time:

## 2022-11-28 ENCOUNTER — NON-APPOINTMENT (OUTPATIENT)
Age: 37
End: 2022-11-28

## 2022-11-28 ENCOUNTER — APPOINTMENT (OUTPATIENT)
Dept: OBGYN | Facility: CLINIC | Age: 37
End: 2022-11-28

## 2022-11-28 VITALS — WEIGHT: 170 LBS | SYSTOLIC BLOOD PRESSURE: 126 MMHG | DIASTOLIC BLOOD PRESSURE: 87 MMHG | BODY MASS INDEX: 27.44 KG/M2

## 2022-11-28 PROCEDURE — 0502F SUBSEQUENT PRENATAL CARE: CPT

## 2022-12-07 ENCOUNTER — APPOINTMENT (OUTPATIENT)
Dept: ANTEPARTUM | Facility: CLINIC | Age: 37
End: 2022-12-07

## 2022-12-07 ENCOUNTER — APPOINTMENT (OUTPATIENT)
Dept: OBGYN | Facility: CLINIC | Age: 37
End: 2022-12-07

## 2022-12-07 VITALS
BODY MASS INDEX: 27.97 KG/M2 | HEIGHT: 66 IN | WEIGHT: 174 LBS | DIASTOLIC BLOOD PRESSURE: 85 MMHG | SYSTOLIC BLOOD PRESSURE: 121 MMHG

## 2022-12-07 PROCEDURE — 0502F SUBSEQUENT PRENATAL CARE: CPT

## 2022-12-07 PROCEDURE — 76816 OB US FOLLOW-UP PER FETUS: CPT

## 2022-12-07 PROCEDURE — 76818 FETAL BIOPHYS PROFILE W/NST: CPT | Mod: 59

## 2022-12-14 ENCOUNTER — APPOINTMENT (OUTPATIENT)
Dept: ANTEPARTUM | Facility: CLINIC | Age: 37
End: 2022-12-14

## 2022-12-14 ENCOUNTER — APPOINTMENT (OUTPATIENT)
Dept: OBGYN | Facility: CLINIC | Age: 37
End: 2022-12-14

## 2022-12-14 DIAGNOSIS — Z33.1 PREGNANT STATE, INCIDENTAL: ICD-10-CM

## 2022-12-14 DIAGNOSIS — Z00.00 ENCOUNTER FOR GENERAL ADULT MEDICAL EXAMINATION W/OUT ABNORMAL FINDINGS: ICD-10-CM

## 2022-12-14 PROCEDURE — 0502F SUBSEQUENT PRENATAL CARE: CPT

## 2022-12-14 PROCEDURE — 76818 FETAL BIOPHYS PROFILE W/NST: CPT

## 2022-12-15 ENCOUNTER — INPATIENT (INPATIENT)
Facility: HOSPITAL | Age: 37
LOS: 1 days | Discharge: HOME | End: 2022-12-17
Attending: OBSTETRICS & GYNECOLOGY | Admitting: OBSTETRICS & GYNECOLOGY

## 2022-12-15 DIAGNOSIS — Z98.890 OTHER SPECIFIED POSTPROCEDURAL STATES: Chronic | ICD-10-CM

## 2022-12-15 DIAGNOSIS — Z78.9 OTHER SPECIFIED HEALTH STATUS: ICD-10-CM

## 2022-12-15 LAB
BASOPHILS # BLD AUTO: 0.05 K/UL — SIGNIFICANT CHANGE UP (ref 0–0.2)
BASOPHILS NFR BLD AUTO: 0.6 % — SIGNIFICANT CHANGE UP (ref 0–2)
EOSINOPHIL # BLD AUTO: 0.2 K/UL — SIGNIFICANT CHANGE UP (ref 0–0.5)
EOSINOPHIL NFR BLD AUTO: 2.3 % — SIGNIFICANT CHANGE UP (ref 0–6)
HCT VFR BLD CALC: 37 % — SIGNIFICANT CHANGE UP (ref 34.5–45)
HGB BLD-MCNC: 12.9 G/DL — SIGNIFICANT CHANGE UP (ref 11.5–15.5)
IANC: 5.46 K/UL — SIGNIFICANT CHANGE UP (ref 1.8–7.4)
IMM GRANULOCYTES NFR BLD AUTO: 0.3 % — SIGNIFICANT CHANGE UP (ref 0–0.9)
LYMPHOCYTES # BLD AUTO: 2.33 K/UL — SIGNIFICANT CHANGE UP (ref 1–3.3)
LYMPHOCYTES # BLD AUTO: 26.8 % — SIGNIFICANT CHANGE UP (ref 13–44)
MCHC RBC-ENTMCNC: 33.8 PG — SIGNIFICANT CHANGE UP (ref 27–34)
MCHC RBC-ENTMCNC: 34.9 GM/DL — SIGNIFICANT CHANGE UP (ref 32–36)
MCV RBC AUTO: 96.9 FL — SIGNIFICANT CHANGE UP (ref 80–100)
MONOCYTES # BLD AUTO: 0.61 K/UL — SIGNIFICANT CHANGE UP (ref 0–0.9)
MONOCYTES NFR BLD AUTO: 7 % — SIGNIFICANT CHANGE UP (ref 2–14)
NEUTROPHILS # BLD AUTO: 5.46 K/UL — SIGNIFICANT CHANGE UP (ref 1.8–7.4)
NEUTROPHILS NFR BLD AUTO: 63 % — SIGNIFICANT CHANGE UP (ref 43–77)
NRBC # BLD: 0 /100 WBCS — SIGNIFICANT CHANGE UP (ref 0–0)
NRBC # FLD: 0 K/UL — SIGNIFICANT CHANGE UP (ref 0–0)
PLATELET # BLD AUTO: 248 K/UL — SIGNIFICANT CHANGE UP (ref 150–400)
RBC # BLD: 3.82 M/UL — SIGNIFICANT CHANGE UP (ref 3.8–5.2)
RBC # FLD: 11.9 % — SIGNIFICANT CHANGE UP (ref 10.3–14.5)
SARS-COV-2 N GENE NPH QL NAA+PROBE: NOT DETECTED
WBC # BLD: 8.68 K/UL — SIGNIFICANT CHANGE UP (ref 3.8–10.5)
WBC # FLD AUTO: 8.68 K/UL — SIGNIFICANT CHANGE UP (ref 3.8–10.5)

## 2022-12-15 PROCEDURE — 86077 PHYS BLOOD BANK SERV XMATCH: CPT

## 2022-12-15 RX ORDER — CITRIC ACID/SODIUM CITRATE 300-500 MG
15 SOLUTION, ORAL ORAL EVERY 6 HOURS
Refills: 0 | Status: DISCONTINUED | OUTPATIENT
Start: 2022-12-15 | End: 2022-12-16

## 2022-12-15 RX ORDER — OMEGA-3 ACID ETHYL ESTERS 1 G
0 CAPSULE ORAL
Qty: 0 | Refills: 0 | DISCHARGE

## 2022-12-15 RX ORDER — OXYTOCIN 10 UNIT/ML
333.33 VIAL (ML) INJECTION
Qty: 20 | Refills: 0 | Status: DISCONTINUED | OUTPATIENT
Start: 2022-12-15 | End: 2022-12-16

## 2022-12-15 RX ORDER — SODIUM CHLORIDE 9 MG/ML
1000 INJECTION, SOLUTION INTRAVENOUS
Refills: 0 | Status: DISCONTINUED | OUTPATIENT
Start: 2022-12-15 | End: 2022-12-16

## 2022-12-15 RX ORDER — FOLIC ACID 0.8 MG
0 TABLET ORAL
Qty: 0 | Refills: 0 | DISCHARGE

## 2022-12-15 RX ORDER — CHLORHEXIDINE GLUCONATE 213 G/1000ML
1 SOLUTION TOPICAL ONCE
Refills: 0 | Status: DISCONTINUED | OUTPATIENT
Start: 2022-12-15 | End: 2022-12-16

## 2022-12-15 NOTE — OB PROVIDER H&P - HISTORY OF PRESENT ILLNESS
HPI: Pt is a 35 yo  @39.2wks, CASSIA: 22, presenting as a scheduled risk reducing induction of labor. Patient states resolved placenta previa @20 wks; otherwise prenatal course uncomplicated per patient. Patient endorses positive fetal movement. Denies LOF/VB/CTX. Patient denies any complaints at this time.  GBS negative  Covid negative  EFW: 3570g      OBHx: SAB x1 with D&C-   GynHx: denies hx of fibroids, cysts, abnormal Pap smears or STDs. Hx of uterine polypectomy   PMHx: Childhood asthma- No meds, no intubations; Hx of fractured left elbow from fall   PSHx: D&C x 1  Med: PNV  All: NKDA  Psych: denies hx of depression or anxiety  SH: denies hx of smoking, drinking, or drug usage during the pregnancy    Vital Signs Last 24 Hrs  T(C): --  T(F): --  HR: --  BP: --  BP(mean): --  RR: --  SpO2: --        Physical Exam:  Gen: NAD, AxOx3  CV: RRR  Resp: CTAB  Abd: soft, NT, gravid        SVE: 3/50/-3  FHT: Category 1  [place]: Irregular   EFW: 3570g  Sono: Cephalic      A/P: Pt is a 37yo  @ 39.2wks, CASSIA: 22, who presents as a scheduled risk reducing induction of labor.  - GBS negative     1. Admit to LND. Routine Labs. IVF  2. IOL with buccal cytotec  3. Fetus: Cat 1tracing, Vertex, EFW 3570g c/w EFM.  4 GBS negative   5 Pain: IV pain meds/epidural PRN  6 Covid negative 22    FARSHAD Amaral  Discussed with MD Chin

## 2022-12-15 NOTE — OB RN PATIENT PROFILE - NSICDXPASTMEDICALHX_GEN_ALL_CORE_FT
PAST MEDICAL HISTORY:  Arm fracture, left     Fall at home     No pertinent past medical history     Spontaneous

## 2022-12-15 NOTE — OB RN PATIENT PROFILE - FALL HARM RISK - RISK INTERVENTIONS

## 2022-12-15 NOTE — OB RN PATIENT PROFILE - THE IMPORTANCE OF INITIATING BREASTFEEDING WITHIN THE FIRST HOUR OF BIRTH.
bilateral L5 transforaminal epidural steroid injection with RN IV sedation scheduled 10/05/2022. Patient has had Covid vaccinations. Pre admit instructions mailed to patient.       Statement Selected

## 2022-12-15 NOTE — OB PROVIDER H&P - CLICK TO LAUNCH ORM
IMPRESSION:  Supratherapeutic INR  hematuria likely due to elevated INR  HO Afib      PLAN:    CNS: avoid sedation    HEENT: Oral care    PULMONARY:  HOB @ 45 degrees.  Aspiration precautions     CARDIOVASCULAR: adequate MAP    GI: GI prophylaxis.  Feeding.  Bowel regimen     RENAL:  Follow up lytes.  Correct as needed, urology inpatient vs outpatient    INFECTIOUS DISEASE: Follow up cultures    HEMATOLOGICAL:  elevated INR, monitor daily    ENDOCRINE:  Follow up FS.  Insulin protocol if needed.    MUSCULOSKELETAL:  as tolerated    recall as needed        
.

## 2022-12-16 ENCOUNTER — TRANSCRIPTION ENCOUNTER (OUTPATIENT)
Age: 37
End: 2022-12-16

## 2022-12-16 LAB
BLD GP AB SCN SERPL QL: POSITIVE — SIGNIFICANT CHANGE UP
COVID-19 SPIKE DOMAIN AB INTERP: POSITIVE
COVID-19 SPIKE DOMAIN ANTIBODY RESULT: 222 U/ML — HIGH
RH IG SCN BLD-IMP: NEGATIVE — SIGNIFICANT CHANGE UP
SARS-COV-2 IGG+IGM SERPL QL IA: 222 U/ML — HIGH
SARS-COV-2 IGG+IGM SERPL QL IA: POSITIVE
T PALLIDUM AB TITR SER: NEGATIVE — SIGNIFICANT CHANGE UP

## 2022-12-16 PROCEDURE — 59400 OBSTETRICAL CARE: CPT

## 2022-12-16 RX ORDER — HYDROCORTISONE 1 %
1 OINTMENT (GRAM) TOPICAL EVERY 6 HOURS
Refills: 0 | Status: DISCONTINUED | OUTPATIENT
Start: 2022-12-16 | End: 2022-12-17

## 2022-12-16 RX ORDER — ONDANSETRON 8 MG/1
4 TABLET, FILM COATED ORAL ONCE
Refills: 0 | Status: COMPLETED | OUTPATIENT
Start: 2022-12-16 | End: 2022-12-16

## 2022-12-16 RX ORDER — IBUPROFEN 200 MG
600 TABLET ORAL EVERY 6 HOURS
Refills: 0 | Status: DISCONTINUED | OUTPATIENT
Start: 2022-12-16 | End: 2022-12-17

## 2022-12-16 RX ORDER — IBUPROFEN 200 MG
600 TABLET ORAL EVERY 6 HOURS
Refills: 0 | Status: COMPLETED | OUTPATIENT
Start: 2022-12-16 | End: 2023-11-14

## 2022-12-16 RX ORDER — DIPHENHYDRAMINE HCL 50 MG
25 CAPSULE ORAL EVERY 6 HOURS
Refills: 0 | Status: DISCONTINUED | OUTPATIENT
Start: 2022-12-16 | End: 2022-12-17

## 2022-12-16 RX ORDER — OXYCODONE HYDROCHLORIDE 5 MG/1
5 TABLET ORAL
Refills: 0 | Status: DISCONTINUED | OUTPATIENT
Start: 2022-12-16 | End: 2022-12-17

## 2022-12-16 RX ORDER — MAGNESIUM HYDROXIDE 400 MG/1
30 TABLET, CHEWABLE ORAL
Refills: 0 | Status: DISCONTINUED | OUTPATIENT
Start: 2022-12-16 | End: 2022-12-17

## 2022-12-16 RX ORDER — PRAMOXINE HYDROCHLORIDE 150 MG/15G
1 AEROSOL, FOAM RECTAL EVERY 4 HOURS
Refills: 0 | Status: DISCONTINUED | OUTPATIENT
Start: 2022-12-16 | End: 2022-12-17

## 2022-12-16 RX ORDER — BENZOCAINE 10 %
1 GEL (GRAM) MUCOUS MEMBRANE EVERY 6 HOURS
Refills: 0 | Status: DISCONTINUED | OUTPATIENT
Start: 2022-12-16 | End: 2022-12-17

## 2022-12-16 RX ORDER — OXYCODONE HYDROCHLORIDE 5 MG/1
5 TABLET ORAL ONCE
Refills: 0 | Status: DISCONTINUED | OUTPATIENT
Start: 2022-12-16 | End: 2022-12-17

## 2022-12-16 RX ORDER — IBUPROFEN 200 MG
1 TABLET ORAL
Qty: 0 | Refills: 0 | DISCHARGE
Start: 2022-12-16

## 2022-12-16 RX ORDER — TETANUS TOXOID, REDUCED DIPHTHERIA TOXOID AND ACELLULAR PERTUSSIS VACCINE, ADSORBED 5; 2.5; 8; 8; 2.5 [IU]/.5ML; [IU]/.5ML; UG/.5ML; UG/.5ML; UG/.5ML
0.5 SUSPENSION INTRAMUSCULAR ONCE
Refills: 0 | Status: DISCONTINUED | OUTPATIENT
Start: 2022-12-16 | End: 2022-12-17

## 2022-12-16 RX ORDER — LANOLIN
1 OINTMENT (GRAM) TOPICAL EVERY 6 HOURS
Refills: 0 | Status: DISCONTINUED | OUTPATIENT
Start: 2022-12-16 | End: 2022-12-17

## 2022-12-16 RX ORDER — DIBUCAINE 1 %
1 OINTMENT (GRAM) RECTAL EVERY 6 HOURS
Refills: 0 | Status: DISCONTINUED | OUTPATIENT
Start: 2022-12-16 | End: 2022-12-17

## 2022-12-16 RX ORDER — SIMETHICONE 80 MG/1
80 TABLET, CHEWABLE ORAL EVERY 4 HOURS
Refills: 0 | Status: DISCONTINUED | OUTPATIENT
Start: 2022-12-16 | End: 2022-12-17

## 2022-12-16 RX ORDER — SODIUM CHLORIDE 9 MG/ML
3 INJECTION INTRAMUSCULAR; INTRAVENOUS; SUBCUTANEOUS EVERY 8 HOURS
Refills: 0 | Status: DISCONTINUED | OUTPATIENT
Start: 2022-12-16 | End: 2022-12-17

## 2022-12-16 RX ORDER — ACETAMINOPHEN 500 MG
975 TABLET ORAL
Refills: 0 | Status: DISCONTINUED | OUTPATIENT
Start: 2022-12-16 | End: 2022-12-17

## 2022-12-16 RX ORDER — OXYTOCIN 10 UNIT/ML
41.67 VIAL (ML) INJECTION
Qty: 20 | Refills: 0 | Status: DISCONTINUED | OUTPATIENT
Start: 2022-12-16 | End: 2022-12-17

## 2022-12-16 RX ORDER — AER TRAVELER 0.5 G/1
1 SOLUTION RECTAL; TOPICAL EVERY 4 HOURS
Refills: 0 | Status: DISCONTINUED | OUTPATIENT
Start: 2022-12-16 | End: 2022-12-17

## 2022-12-16 RX ORDER — ACETAMINOPHEN 500 MG
2 TABLET ORAL
Qty: 0 | Refills: 0 | DISCHARGE
Start: 2022-12-16

## 2022-12-16 RX ORDER — KETOROLAC TROMETHAMINE 30 MG/ML
30 SYRINGE (ML) INJECTION ONCE
Refills: 0 | Status: DISCONTINUED | OUTPATIENT
Start: 2022-12-16 | End: 2022-12-16

## 2022-12-16 RX ADMIN — ONDANSETRON 4 MILLIGRAM(S): 8 TABLET, FILM COATED ORAL at 02:29

## 2022-12-16 RX ADMIN — Medication 600 MILLIGRAM(S): at 21:04

## 2022-12-16 RX ADMIN — Medication 30 MILLIGRAM(S): at 08:00

## 2022-12-16 RX ADMIN — SODIUM CHLORIDE 3 MILLILITER(S): 9 INJECTION INTRAMUSCULAR; INTRAVENOUS; SUBCUTANEOUS at 22:00

## 2022-12-16 RX ADMIN — Medication 125 MILLIUNIT(S)/MIN: at 05:35

## 2022-12-16 RX ADMIN — Medication 600 MILLIGRAM(S): at 21:35

## 2022-12-16 RX ADMIN — MAGNESIUM HYDROXIDE 30 MILLILITER(S): 400 TABLET, CHEWABLE ORAL at 17:41

## 2022-12-16 NOTE — OB PROVIDER DELIVERY SUMMARY - NSLOWPPHRISK_OBGYN_A_OB
No previous uterine incision/[place] Pregnancy/Less than or equal to 4 previous vaginal births/No known bleeding disorder/No history of postpartum hemorrhage/No other PPH risks indicated

## 2022-12-16 NOTE — OB RN DELIVERY SUMMARY - BABY A SEX
Información para padres sobre la vacuna contra el VPH  HPV Vaccine Information for Parents    El VPH (virus del papiloma humano) es un virus común que se transmite de bianca persona a otra a través del contacto sexual. Puede transmitirse gabriela el sexo vaginal, anal u oral. Hay varios tipos de virus del papiloma humano y algunos pueden causar cáncer.  Los niños pueden recibir bianca vacuna para evitar la infección por el VPH y el cáncer. La vacuna es brooke y eficaz. Se recomienda en los niños y las niñas de entre 11 y 12 años de edad. Recibir la vacunación a ken edad, antes de ser sexualmente activos, les da a los niños mejores probabilidades de protección contra la infección por el VPH gabriela la adultez.  ¿Cómo puede afectar a mi hijo el VPH?  La infección por el VPH puede causar:  · Verrugas genitales.  · Cáncer en la boca o cáncer de garganta (cáncer bucofaríngeo).  · Cáncer anal.  · Cáncer cervical, de vulva o vaginal.  · Cáncer de pene.  Gabriela el embarazo, la infección por el VPH puede transmitirse al bebé. Esta infección puede causar verrugas en la garganta y la tráquea del bebé.  ¿Qué medidas puedo alize para reducir el riesgo de que mi hijo contraiga el VPH?  Para reducir el riesgo del rosio de tener bianca infección por el VPH, blanco que reciba la vacuna contra el VPH antes de que comience a ser sexualmente activo. El mejor momento para la vacunación es entre los 11 y 12 años de edad, aunque también puede aplicarse a partir de los 9 años. Si el rosio recibe la primera dosis antes de los 15 años de edad, la vacunación puede administrarse gagan dos inyecciones (dosis), con bianca separación de 6 a 12 meses. En algunas situaciones, se requieren 3 dosis:  · Si el rosio recibe la primera dosis de la vacuna antes de los 15 años de edad, devendra no recibe bianca segunda dosis dentro del plazo de 6 a 12 meses, necesitará 3 dosis para completar la vacunación. Cuando el rosio reciba la primera dosis, es importante que programe bianca britt  para la siguiente dosis y no deje de concurrir a ken britt.  · Los adolescentes que no se vacunen antes de los 15 años de edad necesitarán 3 dosis administradas dentro de un plazo de 6 meses.  · Es posible que el rosio necesite 3 dosis si tiene un sistema inmunitario débil.  Los adultos jóvenes también pueden recibir la vacuna, incluso si son activos sexualmente e incluso si ya tienen la infección por el VPH. La vacunación aún puede ayudar a prevenir los tipos de VPH que causan cáncer con los cuales la persona no está infectada.  ¿Cuáles son los riesgos y los beneficios de la vacuna contra el VPH?  Beneficios  El principal beneficio de recibir la vacuna es prevenir ciertos tipos de cáncer, por ejemplo:  · Cáncer cervical, de vulva y vaginal en las mujeres.  · Cáncer de pene en los hombres.  · Cáncer bucal y anal en mujeres y hombres.  El riesgo de estos tipos de cáncer es yue si el rosio recibe la vacuna antes de empezar a ser sexualmente activo.  La vacuna también previene las verrugas genitales causadas por el VPH.  Riesgos  Los riesgos, aunque son bajos, incluyen efectos secundarios o reacciones a la vacuna. Se caicedo informado muy pocos casos de reacciones graves, devendra pueden incluir:  · Dolor, enrojecimiento o hinchazón alrededor del sitio de la inyección.  · Mareos o dolor de pepito.  · Fiebre.  ¿Quiénes no deberían recibir la vacuna contra el VPH o deberían esperar para recibirla?  Algunos niños no deben recibir la vacuna contra el VPH o deben esperar. Hable con el pediatra sobre los riesgos y los beneficios de la vacuna si el rosio o jackie:  · Ha tenido bianca reacción alérgica grave a otras vacunas.  · Es alérgico a las levaduras.  · Tiene fiebre.  · Ha tenido bianca enfermedad reciente.  · Está embarazada o podría estarlo.  Dónde buscar más información  · Centers for Disease Control and Prevention (Centros para el Control y la Prevención de Enfermedades): cdc.gov/hpv  · American Academy of Pediatrics (Academia  Estadounidense de Pediatría): healthychildren.org  Resumen  · El VPH (virus del papiloma humano) es un virus común que se transmite de bianca persona a otra a través del contacto sexual. Puede transmitirse gabriela el sexo vaginal, anal u oral.  · Los niños pueden recibir bianca vacuna para evitar la infección por el VPH y el cáncer. Es recomendable que reciban la vacuna antes de comenzar a ser sexualmente activos.  · La vacuna contra el VPH puede proteger al rosio de las verrugas genitales y algunos tipos de cáncer, gagan el cáncer de rebecca uterino, garganta, boca, vulva, vagina, ano y pene.  · La vacuna contra el VPH es brooke y eficaz.  · El mejor momento para que los niños y las niñas reciban la vacuna es entre los 11 y 12 años de edad.  Esta información no tiene gagan fin reemplazar el consejo del médico. Asegúrese de hacerle al médico cualquier pregunta que tenga.  Document Revised: 06/14/2019 Document Reviewed: 06/14/2019  Elsevier Patient Education © 2020 Elsevier Inc.     Male

## 2022-12-16 NOTE — OB PROVIDER LABOR PROGRESS NOTE - NS_SUBJECTIVE/OBJECTIVE_OBGYN_ALL_OB_FT
Patient seen for VE. S/p epidural placement. Patient comfortable, no complaints at this time.  VS  T(C): 36.5 (12-16-22 @ 02:12)  HR: 70 (12-16-22 @ 03:22)  BP: 106/65 (12-16-22 @ 03:21)  RR: 15 (12-15-22 @ 21:10)  SpO2: 97% (12-16-22 @ 03:22)
Patient c/o increased rectal pressure. Seen and examined at bedside.  VS  T(C): 36.5 (12-16-22 @ 02:12)  HR: 98 (12-16-22 @ 04:35)  BP: 122/70 (12-16-22 @ 04:35)  RR: 15 (12-15-22 @ 21:10)  SpO2: 99% (12-16-22 @ 04:35)

## 2022-12-16 NOTE — DISCHARGE NOTE OB - CARE PROVIDER_API CALL
Surya Almonte)  MaternalFetal Medicine; Obstetrics and Gynecology  [address] [place] [address]  Phone: [phone]  Fax: [phone]  Follow Up Time:

## 2022-12-16 NOTE — DISCHARGE NOTE OB - MEDICATION SUMMARY - MEDICATIONS TO TAKE
I will START or STAY ON the medications listed below when I get home from the hospital:    ibuprofen 600 mg oral tablet  -- 1 tab(s) by mouth every 6 hours  -- Indication: For Encounter for full-term uncomplicated delivery    acetaminophen 500 mg oral tablet  -- 2 tab(s) by mouth every 6 hours  -- Indication: For Encounter for full-term uncomplicated delivery    PNV Prenatal oral tablet  -- 1 tab(s) by mouth once a day  -- Indication: For Encounter for full-term uncomplicated delivery

## 2022-12-16 NOTE — OB PROVIDER DELIVERY SUMMARY - NSPROVIDERDELIVERYNOTE_OBGYN_ALL_OB_FT
of a live male infant born in good condition. Placenta delivered without any complications and completely. Small second degree laceration repaired with 2 zero chromic gut.  No increase in risk of bleeding infection or DVT

## 2022-12-16 NOTE — OB RN DELIVERY SUMMARY - NS_RNDELIVATTEST_OBGYN_ALL_OB
OB Provider reported that the vagina was inspected and no foreign body was found/Laps, needles and instrument count was correct Yes

## 2022-12-16 NOTE — OB RN DELIVERY SUMMARY - NS_SEPSISRSKCALC_OBGYN_ALL_OB_FT
EOS calculated successfully. EOS Risk Factor: 0.5/1000 live births ([facility] national incidence); GA=39w3d; Temp=98.24; ROM=5; GBS='Negative'; Antibiotics='No antibiotics or any antibiotics < 2 hrs prior to birth'

## 2022-12-16 NOTE — OB PROVIDER LABOR PROGRESS NOTE - ASSESSMENT
Cervical change noted  Anticipate    MD Dailey aware and en route to hospital  Cont EFM/AUBREY Amaral NP
 @ 39.3wks rrIOL  SROM @0030- clear fluid  Cervical change noted   sp buccal cytotec  Exp management at this time   Consider pitocin if contractions space out  Cont EFM/TOCO    dw MD Elsy Amaral NP

## 2022-12-16 NOTE — OB RN DELIVERY SUMMARY - NSSELHIDDEN_OBGYN_ALL_OB_FT
[NS_DeliveryAttending1_OBGYN_ALL_OB_FT:[id number]] [NS_DeliveryAttending1_OBGYN_ALL_OB_FT:[id number]=],[NS_DeliveryRN_OBGYN_ALL_OB_FT:[id number]=]

## 2022-12-16 NOTE — OB PROVIDER DELIVERY SUMMARY - NS_DELIVERYATTENDING1_OBGYN_ALL_OB_FT
Surya Almonte MD Chonodrocutaneous Helical Advancement Flap Text: The defect edges were debeveled with a #15 scalpel blade.  Given the location of the defect and the proximity to free margins a chondrocutaneous helical advancement flap was deemed most appropriate.  Using a sterile surgical marker, the appropriate advancement flap was drawn incorporating the defect and placing the expected incisions within the relaxed skin tension lines where possible.    The area thus outlined was incised deep to adipose tissue with a #15 scalpel blade.  The skin margins were undermined to an appropriate distance in all directions utilizing iris scissors.

## 2022-12-16 NOTE — LACTATION INITIAL EVALUATION - LACTATION INTERVENTIONS
Primary RN made aware of consult and plan./initiate/review safe skin-to-skin/initiate/review hand expression/initiate/review techniques for position and latch/post discharge community resources provided/review techniques to manage sore nipples/engorgement/initiate/review breast massage/compression/reviewed components of an effective feeding and at least 8 effective feedings per day required/reviewed importance of monitoring infant diapers, the breastfeeding log, and minimum output each day/reviewed risks of artificial nipples/reviewed benefits and recommendations for rooming in/reviewed feeding on demand/by cue at least 8 times a day

## 2022-12-16 NOTE — DISCHARGE NOTE OB - PATIENT PORTAL LINK FT
You can access the FollowMyHealth Patient Portal offered by [facility] by registering at the following website: [email or web address]. By joining [employer]’s FollowMyHealth portal, you will also be able to view your health information using other applications (apps) compatible with our system.

## 2022-12-17 VITALS
HEART RATE: 69 BPM | OXYGEN SATURATION: 100 % | DIASTOLIC BLOOD PRESSURE: 81 MMHG | SYSTOLIC BLOOD PRESSURE: 130 MMHG | RESPIRATION RATE: 17 BRPM | TEMPERATURE: 98 F

## 2022-12-17 RX ADMIN — Medication 600 MILLIGRAM(S): at 14:45

## 2022-12-17 RX ADMIN — Medication 600 MILLIGRAM(S): at 02:33

## 2022-12-17 RX ADMIN — Medication 600 MILLIGRAM(S): at 03:00

## 2022-12-17 RX ADMIN — Medication 600 MILLIGRAM(S): at 08:39

## 2022-12-17 RX ADMIN — SODIUM CHLORIDE 3 MILLILITER(S): 9 INJECTION INTRAMUSCULAR; INTRAVENOUS; SUBCUTANEOUS at 05:14

## 2022-12-17 RX ADMIN — Medication 600 MILLIGRAM(S): at 09:39

## 2022-12-17 RX ADMIN — Medication 600 MILLIGRAM(S): at 13:46

## 2022-12-17 NOTE — PROGRESS NOTE ADULT - SUBJECTIVE AND OBJECTIVE BOX
S: Patient doing well. No complaints. Minimal lochia. Pain controlled.    O: Vital Signs Last 24 Hrs  T(C): 36.7 (17 Dec 2022 05:54), Max: 36.9 (16 Dec 2022 14:08)  T(F): 98 (17 Dec 2022 05:54), Max: 98.4 (16 Dec 2022 14:08)  HR: 62 (17 Dec 2022 05:54) (62 - 77)  BP: 111/67 (17 Dec 2022 05:54) (111/67 - 113/70)  BP(mean): --  RR: 15 (17 Dec 2022 05:54) (15 - 18)  SpO2: 98% (17 Dec 2022 05:54) (98% - 100%)    Parameters below as of 17 Dec 2022 05:54  Patient On (Oxygen Delivery Method): room air        Gen: NAD  Abd: soft, Nontender, Nondistended, fundus firm  Ext: no tendern, mild edema    Labs:                        12.9   8.68  )-----------( 248      ( 15 Dec 2022 21:51 )             37.0       A: 36y PPD#1 s/p  doing well.    Plan:  Routine postpartum care  Encouraged out of bed  Regular diet

## 2023-01-17 PROBLEM — S42.302A UNSPECIFIED FRACTURE OF SHAFT OF HUMERUS, LEFT ARM, INITIAL ENCOUNTER FOR CLOSED FRACTURE: Chronic | Status: ACTIVE | Noted: 2022-12-15

## 2023-01-17 PROBLEM — O03.9 COMPLETE OR UNSPECIFIED SPONTANEOUS ABORTION WITHOUT COMPLICATION: Chronic | Status: ACTIVE | Noted: 2022-12-15
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
## 2023-01-25 ENCOUNTER — APPOINTMENT (OUTPATIENT)
Dept: OBGYN | Facility: CLINIC | Age: 38
End: 2023-01-25
Payer: COMMERCIAL

## 2023-01-25 VITALS — WEIGHT: 151 LBS | BODY MASS INDEX: 24.37 KG/M2

## 2023-01-25 VITALS — HEART RATE: 71 BPM | DIASTOLIC BLOOD PRESSURE: 85 MMHG | HEIGHT: 66 IN | SYSTOLIC BLOOD PRESSURE: 125 MMHG

## 2023-01-25 PROCEDURE — 0503F POSTPARTUM CARE VISIT: CPT

## 2023-01-25 NOTE — HISTORY OF PRESENT ILLNESS
[Complications:___] : no complications [Delivery Date: ___] : on [unfilled] [] : delivered by vaginal delivery [Male] : Delivery History: baby boy [Wt. ___] : weighing [unfilled] [Breastfeeding] : currently nursing [BF with Difficulty] : nursing without difficulty [Back to Normal] : is back to normal in size [Normal] : the vagina was normal [Healing Well] : is healing well [Doing Well] : is doing well [Examination Of The Breasts] : breasts are normal [No Sign of Infection] : is showing no signs of infection [Excellent Pain Control] : has excellent pain control [None] : None [FreeTextEntry8] : HPI: 38y/o F s/p  on 2022 presenting for postpartum visit. Liveborn male. She is breastfeeding. She is feeling well and is without complaints. Her mood is stable. [de-identified] : PP exam WNL  [de-identified] : 38 y/o F presenting for 6 week PP visit\par -normal PP exam\par -Patient cleared to resume intercourse and exercise\par -Patient counseled on contraception options, desires none, will be fine with another pregnancy \par -f/u for 3 months for annual

## 2023-02-05 DIAGNOSIS — N61.0 MASTITIS WITHOUT ABSCESS: ICD-10-CM

## 2023-02-05 RX ORDER — SULFAMETHOXAZOLE AND TRIMETHOPRIM 800; 160 MG/1; MG/1
800-160 TABLET ORAL TWICE DAILY
Qty: 28 | Refills: 0 | Status: ACTIVE | COMMUNITY
Start: 2023-02-05 | End: 1900-01-01

## 2023-02-20 NOTE — OB RN PATIENT PROFILE - WEIGHT IN KG
H&P Exam - Electrophysiology  Huang Mendiola 68 y o  female MRN: [id number]  Unit/Bed#: [facility] 531-01 Encounter: [id number]    Assessment/Plan     Assessment:  1  Symptomatic paroxysmal atrial fibrillation  - anticoagulated with Eliquis / Cggks2Vske score of 3 (age, sex)  - EF of 65% per echo 6/2020 / mild dilation of LA noted  - rate control: diltiazem  - antiarrhythmic therapy: being started on new AAD / was on flecainide with breakthrough   - prior ablation: cryo of atrial fibrillation with pulmonary vein isolation by Dr Juan José Moon 8/2016  2  Medtronic dual-chamber pacemaker in situ  -Implanted by Dr Gretchen Luevano on 6/7/2021 due to sick sinus syndrome  3  Hypothyroidism  4  History of breast cancer   - status post chemotherapy  5  Obesity BMI of 31  6  Benign paroxysmal positional vertigo  - utilizes meclizine as needed    Plan:  Patient presents today to undergo initiation of antiarrhythmic initiation  She did discontinue flecainide 2 weeks ago  In review of her prior EKGs and creatinine clearance, will start sotalol 120 mg daily today  This can be started midday and will be backed about an hour or so each day until she is taking this 9 AM daily  We will continue diltiazem as she does have a device in place as well but continue to monitor blood pressures to make sure this is warranted  Patient does have lower extremity edema and that she will be inpatient, will give her 1 dose of IV diuretic tomorrow  We will replete potassium today prior to initiating this tomorrow  She was advised that she may need to stay until Thursday as we are doing once daily dosing of sotalol  We will continue to monitor electrolytes, telemetry, and EKG  EKG to be done 2 hours after each dose  ADDENDUM: After first dose of sotalol, QT interval did change from 440 ms to 480 ms  Will just make sure with an EKG in the morning with the QT interval looks like prior to giving second dose at around 1130        History of Present Illness   HPI:  Natividad Tan is a 68y o  year old female with past medical history as stated above  She has been followed by Dr Mary Bailey and in the past had been maintained on flecainide therapy  She was also found to be bradycardic with sick sinus syndrome and she therefore underwent pacemaker implantation in June 2021  More recently she has had more episodes of atrial fibrillation and flutter, breaking through flecainide  Her main symptom is shortness of breath and not being able to get from one room to the other without experiencing this  She felt as though she has had atrial fibrillation earlier this morning  It was discussed over the phone and she was interested in trying a different antiarrhythmic  She therefore discontinued flecainide 2 weeks ago and presents today to undergo antiarrhythmic initiation  EKG:       Review of Systems  ROS as noted above, otherwise 12 point review of systems was performed and is negative       Historical Information   Past Medical History:   Diagnosis Date   • Abnormal chest CT     last assessed - [id number]   • Abnormal glucose     Resolved - [id number]   • Arrhythmia    • Arthritis     Slight   • BCC (basal cell carcinoma of skin)     last assessed - [id number]   • BRCA gene mutation negative 09/30/2021    [employer]; 36 gene panel   • Breast cancer ([facility] [address] ) 09/12/2016    rt   • Cancer ([facility] )    • Cardiomyopathy ([facility] )    • Cervical spinal stenosis    • Discharge from left nipple     last assessed - [id number]   • Disease of thyroid gland     hypothyroidism   • Full dentures     Upper and Lower   • History of atrial fibrillation    • Homocysteinemia    • Hypertension    • Hypokalemia    • Intraductal papilloma of breast     last assessed - [id number]   • Limb alert care status     right upper ext   • Malignant neoplasm of skin     basal cell on face, Moh's surgery   • Memory loss     last assessed - [id number]   • Obesity    • Open wound     last assessed - [id number]   • Osteopenia    • Overactive bladder    • Palmar plantar erythrodysaesthesia     Resolved - [id number]   • Pulmonary nodule seen on imaging study    • Rosacea    • UTI (urinary tract infection)     recently   • Vertigo    • Vitamin D deficiency      Past Surgical History:   Procedure Laterality Date   • APPENDECTOMY     • ATRIAL ABLATION SURGERY      last assessed - [id number]   • BACK SURGERY  1997    Lumbar spinal stenosis   • BREAST BIOPSY      2014? left breast; 9/12/16 right breast x 3   • BREAST BIOPSY Left 03/30/2010   • BREAST BIOPSY Right 09/12/2016    us bx- invasive br ca   • BREAST SURGERY      needle bx  • BREAST SURGERY Right 03/16/2017    excision of right chest wall lesion   • CARDIAC ELECTROPHYSIOLOGY STUDY AND ABLATION     • CARDIOVERSION     • DENTAL SURGERY     • HAND SURGERY      Joint replaced with silicone joint 5th finger right hand   • HYSTERECTOMY  1993    Complete   • LAMINECTOMY      Decompressive up to two lumbar segments   • MASTECTOMY Right 10/28/2016   • OOPHORECTOMY  1993   • NV EXCISION MAL LESION TRUNK/ARM/LEG 0 5 CM/< Right 03/16/2017    Procedure: EXCISION CHEST WALL LESION; NEEDLE LOC @ 1400;  Surgeon: Madeline Mtz MD;  Location: [facility];  Service: Surgical Oncology   • NV INTRAOP SENTINEL LYMPH NODE ID W/DYE INJECTION Right 10/28/2016    Procedure: BREAST MASTECTOMY WITH SENTINAL LYMPH NODE BIOPSY; Randi Armas; LYMPHATIC MAPPING; 0800 NUC MED INJECTION;  Surgeon: Madeline Mtz MD;  Location: [facility];  Service: Surgical Oncology   • NV MASTECTOMY SIMPLE COMPLETE Left 10/22/2021    Procedure: BREAST MASTECTOMY SIMPLE MARTHA  GUIDED;   Surgeon: Suzi Fatima MD;  Location: [facility];  Service: Surgical Oncology   • SENTINEL LYMPH NODE BIOPSY Right    • US BREAST NEEDLE LOC LEFT Left 10/19/2021   • US BREAST NEEDLE LOC RIGHT Right 03/16/2017   • US GUIDANCE BREAST BIOPSY LEFT EACH ADDITIONAL Left 09/21/2021   • US GUIDANCE BREAST BIOPSY RIGHT EACH ADDITIONAL Right 09/12/2016   • US GUIDANCE BREAST BIOPSY RIGHT EACH ADDITIONAL Right 09/12/2016   • US GUIDED BREAST BIOPSY LEFT COMPLETE Left 09/21/2021   • US GUIDED BREAST BIOPSY RIGHT COMPLETE Right 09/12/2016   • US GUIDED BREAST BIOPSY RIGHT COMPLETE Right 10/1/2021     Family History:   Family History   Problem Relation Age of Onset   • Coronary artery disease Mother         CABG   • Diabetes Mother    • Heart attack Mother    • Diabetes Sister    • Parkinsonism Sister    • Coronary artery disease Brother         CABG   • Diabetes Brother    • Heart attack Brother    • Heart attack Son    • Kidney cancer Son 48   • No Known Problems Son    • No Known Problems Son    • Emphysema Sister    • No Known Problems Sister    • Heart attack Brother    • No Known Problems Brother    • No Known Problems Maternal Grandmother    • No Known Problems Maternal Grandfather    • No Known Problems Paternal Grandmother    • No Known Problems Paternal Grandfather    • No Known Problems Paternal Aunt    • No Known Problems Paternal Aunt    • No Known Problems Paternal Aunt    • No Known Problems Paternal Aunt        Social History   Social History     Substance and Sexual Activity   Alcohol Use No     Social History     Substance and Sexual Activity   Drug Use No     Social History     Tobacco Use   Smoking Status Never   Smokeless Tobacco Never       Meds/Allergies   PTA meds:   Prior to Admission Medications   Prescriptions Last Dose Informant Patient Reported? Taking?    Dilt- MG 24 hr capsule 2/20/2023  No Yes   Sig: TAKE 1 CAPSULE BY MOUTH EVERY DAY   Eliquis 5 MG 2/20/2023  No Yes   Sig: TAKE 1 TABLET BY MOUTH TWICE A DAY   RESTASIS 0 05 % ophthalmic emulsion 2/20/2023  Yes Yes   Sig: Administer 1 drop to both eyes every 12 (twelve) hours   cyanocobalamin (VITAMIN B-12) 500 mcg tablet 2/20/2023  Yes Yes   Sig: Take 1 tablet by mouth daily   flecainide (TAMBOCOR) 100 mg tablet 2/20/2023  No Yes   Sig: TAKE 1 TABLET BY MOUTH TWICE A DAY furosemide (LASIX) 40 mg tablet 2/20/2023  No Yes   Sig: TAKE 1 TABLET (40 MG TOTAL) BY MOUTH DAILY AS DIRECTED   levothyroxine 150 mcg tablet 2/20/2023  No Yes   Sig: TAKE 1 TABLET DAILY TAKE 1 TABLET 3 DAYS A WEEK AND 1/2 TABLET 4 DAYS A WEEK   meclizine (ANTIVERT) 25 mg tablet 2/20/2023  No Yes   Sig: Take 1 tablet (25 mg total) by mouth every 8 (eight) hours as needed for dizziness   oxybutynin (DITROPAN-XL) 10 MG 24 hr tablet 2/19/2023  No Yes   Sig: Take 1 tablet (10 mg total) by mouth daily at bedtime   potassium chloride (MICRO-K) 10 MEQ CR capsule 2/20/2023  No Yes   Sig: TAKE 1 CAPSULE BY MOUTH EVERY DAY   predniSONE 10 mg tablet   No No   Sig: Take 4 tablets today, then three tablets daily for 2 days, two tablets daily for 2 days, then one tablet daily for 2 days  Patient not taking: Reported on 2/15/2023      Facility-Administered Medications: None     Home Medications:   Medications Prior to Admission   Medication   • cyanocobalamin (VITAMIN B-12) 500 mcg tablet   • Dilt- MG 24 hr capsule   • Eliquis 5 MG   • flecainide (TAMBOCOR) 100 mg tablet   • furosemide (LASIX) 40 mg tablet   • levothyroxine 150 mcg tablet   • meclizine (ANTIVERT) 25 mg tablet   • oxybutynin (DITROPAN-XL) 10 MG 24 hr tablet   • potassium chloride (MICRO-K) 10 MEQ CR capsule   • RESTASIS 0 05 % ophthalmic emulsion   • predniSONE 10 mg tablet       Allergies   Allergen Reactions   • Morphine And Related GI Intolerance       Objective   Vitals: Blood pressure 121/67, pulse 73, temperature 97 6 °F (36 4 °C), temperature source Oral, resp  rate 18, height 5' 2" (1 575 m), weight 79 3 kg (174 lb 13 2 oz), SpO2 97 %    Orthostatic Blood Pressures    Flowsheet Row Most Recent Value   Blood Pressure 121/67 filed at 02/20/2023 0931   Patient Position - Orthostatic VS Lying filed at 02/20/2023 0931          No intake or output data in the 24 hours ending 02/20/23 1052    Invasive Devices     Peripheral Intravenous Line  Duration Peripheral IV 23 Left;Proximal;Ventral (anterior) Forearm <1 day          Drain  Duration           Closed/Suction Drain Left;Lateral Chest Bulb 19 Fr  485 days    Closed/Suction Drain Left;Medial Chest Bulb 19 Fr  485 days                Physical Exam   GEN: NAD, alert and oriented, well appearing  SKIN: dry without significant lesions or rashes  HEENT: NCAT, PERRL, EOMs intact  NECK: No JVD or carotid bruits appreciated  CARDIOVASCULAR: RRR, normal S1, S2 without murmurs, rubs, or gallops appreciated  LUNGS: Clear to auscultation bilaterally without wheezes, rhonchi, or rales  ABDOMEN: Soft, nontender, nondistended  EXTREMITIES/VASCULAR: perfused without clubbing, cyanosis, or edema b/l  PSYCH: Normal mood and affect  NEURO: CN ll-Xll grossly intact    Lab Results: I have personally reviewed pertinent lab results  Invalid input(s): LABGLOM              Imaging: I have personally reviewed pertinent reports  Results for orders placed during the hospital encounter of 19    Echo complete with contrast if indicated    Narrative  [address] [place] [address]    Transthoracic Echocardiogram  2D, M-mode, Doppler, and Color Doppler    Study date:  2019    Patient: Foreign Chen  MR number: [id number]  Account number: [de-identified]  : [id number]  Age: 68 years  Gender: Female  Status: Outpatient  Location: [address] [place] [address] [facility]  Height: 62 in  Weight: 178 6 lb  BP: 138/ 74 mmHg    Indications: Shortness of breath, lower exremity edema, PAF      Diagnoses: I48 0 - Atrial fibrillation, R06 02 - Shortness of breath    Sonographer:  CARLOS [facility] RVT  Primary Physician:  Isabela Rangel MD  Referring Physician:  SHADI Ruiz  Group:  [id number]  Interpreting Physician:  Rosaline Zambrano MD    SUMMARY    LEFT VENTRICLE:  Systolic function was normal  Ejection fraction was estimated to be 65 %  There were no regional wall motion abnormalities  Doppler parameters were consistent with abnormal left ventricular relaxation (grade 1 diastolic dysfunction)  LEFT ATRIUM:  The atrium was mildly dilated  MITRAL VALVE:  There was mild annular calcification  There was mild regurgitation  AORTIC VALVE:  There was mild regurgitation  TRICUSPID VALVE:  There was mild to moderate regurgitation  HISTORY: PRIOR HISTORY: Right breast cancer, right mastectomy  PRIOR PROCEDURES: Arrhythmia ablation  PROCEDURE: The study was performed in the [facility]  This was a routine study  The transthoracic approach was used  The study included complete 2D imaging, M-mode, complete spectral Doppler, and color Doppler  Images were obtained from the parasternal, apical, subcostal, and suprasternal notch acoustic windows  Image quality was adequate  LEFT VENTRICLE: Size was normal  Systolic function was normal  Ejection fraction was estimated to be 65 %  There were no regional wall motion abnormalities  Wall thickness was normal  DOPPLER: Doppler parameters were consistent with  abnormal left ventricular relaxation (grade 1 diastolic dysfunction)  RIGHT VENTRICLE: The size was normal  Systolic function was normal  Wall thickness was normal     LEFT ATRIUM: The atrium was mildly dilated  RIGHT ATRIUM: Size was normal     MITRAL VALVE: There was mild annular calcification  Valve structure was normal  There was normal leaflet separation  DOPPLER: The transmitral velocity was within the normal range  There was no evidence for stenosis  There was mild  regurgitation  AORTIC VALVE: The valve was trileaflet  Leaflets exhibited normal thickness, normal cuspal separation, and sclerosis  DOPPLER: Transaortic velocity was within the normal range  There was no evidence for stenosis  There was mild  regurgitation      TRICUSPID VALVE: The valve structure was normal  There was normal leaflet separation  DOPPLER: The transtricuspid velocity was within the normal range  There was no evidence for stenosis  There was mild to moderate regurgitation  Pulmonary  artery systolic pressure was mildly increased  PULMONIC VALVE: Leaflets exhibited normal thickness, no calcification, and normal cuspal separation  DOPPLER: The transpulmonic velocity was within the normal range  There was no significant regurgitation  PERICARDIUM: There was no pericardial effusion  The pericardium was normal in appearance  AORTA: The root exhibited normal size  SYSTEMIC VEINS: IVC: The inferior vena cava was normal in size  PULMONARY VEINS: DOPPLER: There was systolic blunting in the pulmonary vein(s)  SYSTEM MEASUREMENT TABLES    2D  %FS: 31 66 %  Ao Diam: 2 23 cm  EDV(Teich): 105 22 ml  EF(Teich): 59 57 %  ESV(Cube): 34 34 ml  ESV(Teich): 42 54 ml  IVSd: 0 81 cm  LA Area: 21 18 cm2  LA Diam: 3 68 cm  LVEDV MOD A4C: 79 17 ml  LVEF MOD A4C: 66 73 %  LVESV MOD A4C: 26 34 ml  LVIDd: 4 76 cm  LVIDs: 3 25 cm  LVLd A4C: 6 9 cm  LVLs A4C: 5 58 cm  LVPWd: 0 76 cm  RA Area: 13 38 cm2  RV Diam : 3 39 cm  SI(Cube): 40 02 ml/m2  SI(Teich): 34 25 ml/m2  SV MOD A4C: 52 83 ml  SV(Cube): 73 23 ml  SV(Teich): 62 68 ml    CW  AR Dec Anderson: 2 07 m/s2  AR Dec Time: 2636 88 ms  AR PHT: 764 69 ms  AR Vmax: 4 56 m/s  AR maxP 02 mmHg  TR Vmax: 3 1 m/s  TR maxP 41 mmHg    MM  TAPSE: 2 23 cm    PW  E': 0 06 m/s  E/E': 15 62  MV A Obed: 0 32 m/s  MV Dec Anderson: 2 53 m/s2  MV DecT: 397 07 ms  MV E Obed: 1 m/s  MV E/A Ratio: 3 16    [employer] Accredited Echocardiography Laboratory    Prepared and electronically signed by    Jennifer Cordova MD  Signed 2019 12:44:18      Code Status: Level 1 - Full Code    ** Please Note: Dictation voice to text software may have been used in the creation of this document   ** 78.9

## 2023-03-30 ENCOUNTER — APPOINTMENT (OUTPATIENT)
Dept: ORTHOPEDIC SURGERY | Facility: CLINIC | Age: 38
End: 2023-03-30
Payer: COMMERCIAL

## 2023-03-30 VITALS — HEIGHT: 66 IN | WEIGHT: 150 LBS | BODY MASS INDEX: 24.11 KG/M2

## 2023-03-30 PROCEDURE — J3490M: CUSTOM

## 2023-03-30 PROCEDURE — 99203 OFFICE O/P NEW LOW 30 MIN: CPT | Mod: 25

## 2023-03-30 PROCEDURE — 20550 NJX 1 TENDON SHEATH/LIGAMENT: CPT

## 2023-03-30 NOTE — ASSESSMENT
[FreeTextEntry1] : Bilateral First dorsal compartment tendon sheath injection was performed because of pain inflammation and stiffness\par Anesthesia: ethyl chloride sprayed topically\par Celestone: An injection of Celestone 1cc\par Lidocaine: An injection of Lidocaine 1% 1cc\par Marcaine: An injection of Marcaine 0.5% 1cc\par \par Patient has tried OTC's including aspirin, Ibuprofen, Aleve etc or prescription NSAIDS, and/or exercises at home and/ or\par physical therapy without satisfactory response.\par After verbal consent using sterile preparation and technique. The risks, benefits, and alternatives to cortisone injection\par were explained in full to the patient. Risks outlined include but are not limited to infection, sepsis, bleeding, scarring, skin\par discoloration, temporary increase in pain, syncopal episode, failure to resolve symptoms, allergic reaction, symptom\par recurrence, and elevation of blood sugar in diabetics. Patient understood the risks. All questions were answered. After\par discussion of options, patient requested an injection. Oral informed consent was obtained and sterile prep was done of the\par injection site. Sterile technique was utilized for the procedure including the preparation of the solutions used for the\par injection. Patient tolerated the procedure well. Advised to ice the injection site this evening.\par Prep with betadine locally to site. Sterile technique used

## 2023-03-30 NOTE — PHYSICAL EXAM
[de-identified] : R wrist\par Mild swelling\par Tender first dorsal comp\par Decreased thumb and wrist ROM\par +Finklesteins\par \par L wrist\par Mild swelling\par Tender first dorsal comp\par Decreased thumb and wrist ROM\par +Finklesteins\par

## 2023-03-30 NOTE — HISTORY OF PRESENT ILLNESS
[Gradual] : gradual [7] : 7 [6] : 6 [Dull/Aching] : dull/aching [Sharp] : sharp [Tingling] : tingling [Nothing helps with pain getting better] : Nothing helps with pain getting better [de-identified] : Bilateral radial sided wrist pain  [] : no [FreeTextEntry1] : wrists /thumbs  [FreeTextEntry3] : 01/2023 [FreeTextEntry5] : she has pain, swelling and numbness, no injury  [FreeTextEntry6] : numbnesss [FreeTextEntry7] : thumbs  [de-identified] : activity

## 2023-04-17 NOTE — OB PROVIDER TRIAGE NOTE - NS_SONODONE_OBGYN_ALL_OB
Yes

## 2023-05-03 ENCOUNTER — APPOINTMENT (OUTPATIENT)
Dept: DERMATOLOGY | Facility: CLINIC | Age: 38
End: 2023-05-03

## 2023-06-19 NOTE — OB RN PATIENT PROFILE - FUNCTIONAL ASSESSMENT - DAILY ACTIVITY ASSESSMENT TYPE
This was a shared visit with the LEONEL. I reviewed and verified the documentation and independently performed the documented:
Admission

## 2023-07-10 ENCOUNTER — APPOINTMENT (OUTPATIENT)
Dept: ORTHOPEDIC SURGERY | Facility: CLINIC | Age: 38
End: 2023-07-10
Payer: COMMERCIAL

## 2023-07-10 VITALS — BODY MASS INDEX: 24.11 KG/M2 | WEIGHT: 150 LBS | HEIGHT: 66 IN

## 2023-07-10 DIAGNOSIS — M65.4 RADIAL STYLOID TENOSYNOVITIS [DE QUERVAIN]: ICD-10-CM

## 2023-07-10 PROCEDURE — 20550 NJX 1 TENDON SHEATH/LIGAMENT: CPT | Mod: 50

## 2023-07-10 PROCEDURE — J3490M: CUSTOM

## 2023-07-10 PROCEDURE — 99213 OFFICE O/P EST LOW 20 MIN: CPT | Mod: 25

## 2023-07-10 NOTE — ASSESSMENT
[FreeTextEntry1] : Bilateral First dorsal compartment tendon sheath injection was performed because of pain inflammation and stiffness\par Anesthesia: ethyl chloride sprayed topically\par Celestone: An injection of Celestone 1cc\par Lidocaine: An injection of Lidocaine 1% 1cc\par Marcaine: An injection of Marcaine 0.5% 1cc\par x2\par \par Patient has tried OTC's including aspirin, Ibuprofen, Aleve etc or prescription NSAIDS, and/or exercises at home and/ or\par physical therapy without satisfactory response.\par After verbal consent using sterile preparation and technique. The risks, benefits, and alternatives to cortisone injection\par were explained in full to the patient. Risks outlined include but are not limited to infection, sepsis, bleeding, scarring, skin\par discoloration, temporary increase in pain, syncopal episode, failure to resolve symptoms, allergic reaction, symptom\par recurrence, and elevation of blood sugar in diabetics. Patient understood the risks. All questions were answered. After\par discussion of options, patient requested an injection. Oral informed consent was obtained and sterile prep was done of the\par injection site. Sterile technique was utilized for the procedure including the preparation of the solutions used for the\par injection. Patient tolerated the procedure well. Advised to ice the injection site this evening.\par Prep with betadine locally to site. Sterile technique used

## 2023-07-10 NOTE — PHYSICAL EXAM
[de-identified] : R wrist\par Mild swelling\par Tender first dorsal comp\par Decreased thumb and wrist ROM\par +Finklesteins\par \par L wrist\par Mild swelling\par Tender first dorsal comp\par Decreased thumb and wrist ROM\par +Finklesteins\par

## 2023-07-10 NOTE — HISTORY OF PRESENT ILLNESS
[8] : 8 [6] : 6 [Radiating] : radiating [Sharp] : sharp [Tingling] : tingling [Steroid] : Steroid [de-identified] : Bilateral DeQ\par Injections helped for 2 months  [] : no [FreeTextEntry1] : cosmo wrists [FreeTextEntry5] : here for follow up, had CSI last visit [FreeTextEntry6] : numbness

## 2023-10-02 ENCOUNTER — APPOINTMENT (OUTPATIENT)
Dept: OBGYN | Facility: CLINIC | Age: 38
End: 2023-10-02
Payer: COMMERCIAL

## 2023-10-02 VITALS — WEIGHT: 142 LBS | BODY MASS INDEX: 22.92 KG/M2 | DIASTOLIC BLOOD PRESSURE: 75 MMHG | SYSTOLIC BLOOD PRESSURE: 111 MMHG

## 2023-10-02 DIAGNOSIS — Z87.42 PERSONAL HISTORY OF OTHER DISEASES OF THE FEMALE GENITAL TRACT: ICD-10-CM

## 2023-10-02 DIAGNOSIS — B97.7 PAPILLOMAVIRUS AS THE CAUSE OF DISEASES CLASSIFIED ELSEWHERE: ICD-10-CM

## 2023-10-02 DIAGNOSIS — Z01.419 ENCOUNTER FOR GYNECOLOGICAL EXAMINATION (GENERAL) (ROUTINE) W/OUT ABNORMAL FINDINGS: ICD-10-CM

## 2023-10-02 PROCEDURE — 99395 PREV VISIT EST AGE 18-39: CPT

## 2023-10-08 LAB — CYTOLOGY CVX/VAG DOC THIN PREP: NORMAL

## 2023-10-17 NOTE — OB PROVIDER H&P - NSPRENATALCARE_OBGYN_ALL_OB
Subjective:   Name: Charles Glynn  : 10/7/1932  MRN: [id number]     The patient location is: Home   The chief complaint leading to consultation is: Anemia     Visit type: audiovisual    Face to Face time with patient: minutes of total time spent on the encounter, which includes face to face time and non-face to face time preparing to see the patient (eg, review of tests), Obtaining and/or reviewing separately obtained history, Documenting clinical information in the electronic or other health record, Independently interpreting results (not separately reported) and communicating results to the patient/family/caregiver, or Care coordination (not separately reported).     Each patient to whom he or she provides medical services by telemedicine is:  (1) informed of the relationship between the physician and patient and the respective role of any other health care provider with respect to management of the patient; and (2) notified that he or she may decline to receive medical services by telemedicine and may withdraw from such care at any time.    HPI:   Charles Glynn is a 89 y.o. male presents for evaluation of ASHLEY with daughter, Annie, in attend.    Mr Soto was referred after his Hb kept dropping for the past 5 months, with significant microcytic anemia and ferritin of 11. Patient denies any blood in the stool or urine. Having generalized weakness and fatigued, no chest pain or SOB.  Patient had a history of sever GI bleeding almost 10 years ago and had partial colectomy for it. Last Colonoscopy ~ 10 years ago and was told that he does not need them anymore given his age. Patient denies any bone pain, dizziness or headache.     Patient underwent feraheme x2 in 2021 and 2021  with improvement in his symptoms and blood work but again trending down previous discussion about GI work up would like to hold off     Today, 10/17  Patient was seen with his daughter in attend; performs most ADL's by himself.    Still taking oral iron and tolerating it well, denies any blood in the stool   Denies any SOB, CP, palpitations, bleeding, melena, etc.    Past Medical History:   Diagnosis Date    Arthritis     BPH (benign prostatic hyperplasia)     Bradycardia, unspecified 01/31/2020    COPD (chronic obstructive pulmonary disease)     Disorder of kidney and ureter     Chronic kidney disease stage III    Encounter for blood transfusion     Hyperlipidemia     Hypertension      Past Surgical History:   Procedure Laterality Date    A-V CARDIAC PACEMAKER INSERTION Left 1/31/2020    Procedure: INSERTION, CARDIAC PACEMAKER, DUAL CHAMBER;  Surgeon: Bala Bennett MD;  Location: [facility] CATH;  Service: Cardiology;  Laterality: Left;    CATARACT EXTRACTION Bilateral     COLON SURGERY      diverticulitis    EYE SURGERY  2019    Torn retna    HERNIA REPAIR      IMPLANTATION OF BIVENTRICULAR HEART PACEMAKER N/A 6/21/2021    Procedure: INSERTION, PACEMAKER, BIVENTRICULAR;  Surgeon: Bala Bennett MD;  Location: [facility];  Service: Cardiology;  Laterality: N/A;    RETINAL DETACHMENT SURGERY Left     Nov 2019     TREATMENT OF CARDIAC ARRHYTHMIA N/A 3/9/2020    Procedure: Cardioversion/Defibrillation;  Surgeon: Bala Bennett MD;  Location: [facility];  Service: Cardiology;  Laterality: N/A;       Family History   Problem Relation Age of Onset    Skin cancer Mother     Coronary artery disease Father     Diabetes Father        Social History     Socioeconomic History    Marital status:    Tobacco Use    Smoking status: Former     Current packs/day: 2.00     Average packs/day: 2.0 packs/day for 20.0 years (40.0 ttl pk-yrs)     Types: Cigarettes    Smokeless tobacco: Never    Tobacco comments:     quit over 30 years ago   Substance and Sexual Activity    Alcohol use: Yes     Alcohol/week: 3.0 standard drinks of alcohol     Types: 3 Standard drinks or equivalent per week     Comment: occasionally     Drug use: Yes      Types: Hydrocodone     Social Determinants of Health     Financial Resource Strain: Unknown (8/8/2023)    Overall Financial Resource Strain (CARDIA)     Difficulty of Paying Living Expenses: Patient refused   Food Insecurity: Unknown (8/8/2023)    Hunger Vital Sign     Worried About Running Out of Food in the Last Year: Patient refused     Ran Out of Food in the Last Year: Patient refused   Transportation Needs: Unknown (8/8/2023)    PRAPARE - Transportation     Lack of Transportation (Medical): Patient refused     Lack of Transportation (Non-Medical): Patient refused   Physical Activity: Unknown (8/8/2023)    Exercise Vital Sign     Days of Exercise per Week: Patient refused   Stress: Unknown (8/8/2023)    [employer] - Occupational Stress Questionnaire     Feeling of Stress : Patient refused   Social Connections: Unknown (8/8/2023)    Social Connection and Isolation Panel [NHANES]     Frequency of Communication with Friends and Family: Patient refused     Frequency of Social Gatherings with Friends and Family: Patient refused     Attends [employer] Services: More than 4 times per year     Active Member of Clubs or Organizations: Patient refused     Attends Club or Organization Meetings: Patient refused     Marital Status: Patient refused   Housing Stability: Unknown (8/8/2023)    Housing Stability Vital Sign     Unable to Pay for Housing in the Last Year: Patient refused     Number of Places Lived in the Last Year: 1     Unstable Housing in the Last Year: Patient refused       Review of patient's allergies indicates:   Allergen Reactions    Levocetirizine Other (See Comments)     tremors    Levofloxacin Other (See Comments)     Leg pains    Trazodone Other (See Comments)     shaking       Review of Systems   Constitutional: Positive for malaise/fatigue. Negative for chills, decreased appetite and fever.   HENT:  Negative for hoarse voice and sore throat.    Eyes:  Negative for visual  disturbance.   Cardiovascular:  Negative for chest pain.   Respiratory:  Negative for cough and shortness of breath.    Hematologic/Lymphatic: Negative for adenopathy.   Skin:  Negative for rash.   Musculoskeletal:  Negative for back pain, joint swelling and muscle weakness.   Gastrointestinal:  Negative for abdominal pain, diarrhea, hematemesis, hematochezia, hemorrhoids, melena, nausea and vomiting.   Genitourinary:  Negative for frequency and hematuria.   Neurological:  Positive for weakness. Negative for dizziness, headaches and seizures.   Psychiatric/Behavioral:  Negative for depression. The patient is not nervous/anxious.             Objective:     Virtual visit; no vital signs      Physical Exam  Constitutional:       General: He is not in acute distress.  HENT:      Head: Normocephalic.   Pulmonary:      Effort: Pulmonary effort is normal.   Neurological:      Mental Status: He is alert and oriented to person, place, and time.   Psychiatric:         Mood and Affect: Mood normal.         Behavior: Behavior normal.         Current Outpatient Medications on File Prior to Visit   Medication Sig    albuterol (VENTOLIN HFA) 90 mcg/actuation inhaler Inhale 2 puffs into the lungs every 4 (four) hours as needed for Wheezing.    allopurinoL (ZYLOPRIM) 100 MG tablet Take 2 tablets (200 mg total) by mouth once daily.    apixaban (ELIQUIS) 5 mg Tab TAKE ONE TABLET BY MOUTH TWICE DAILY    ascorbic acid, vitamin C, (VITAMIN C) 1000 MG tablet Take 1,000 mg by mouth 2 (two) times daily.    azithromycin (Z-MELITON) 250 MG tablet TAKE ONE TABLET BY MOUTH ONCE DAILY    COLCRYS 0.6 mg tablet TAKE 1 TABLET (0.6 MG TOTAL) BY MOUTH 2 (TWO) TIMES DAILY AS NEEDED (GOUT FLARE).    dorzolamide-timolol 2-0.5% (COSOPT) 22.3-6.8 mg/mL ophthalmic solution Place 1 drop into the left eye once daily.    ferrous sulfate (FEOSOL) Tab tablet Take 1 tablet by mouth daily with breakfast.    HYDROcodone-acetaminophen (NORCO)  mg per tablet Take  1 tablet by mouth 3 (three) times daily as needed for Pain (pain).    ipratropium (ATROVENT) 42 mcg (0.06 %) nasal spray 2 sprays by Each Nostril route 3 (three) times daily. To decrease watery nasal discharge    lovastatin (MEVACOR) 20 MG tablet Take 1 tablet (20 mg total) by mouth every evening.    multivitamin capsule Take 1 capsule by mouth once daily.    polycarbophil (FIBERCON) 625 mg tablet Take 625 mg by mouth once daily.    potassium chloride (KLOR-CON) 10 MEQ TbSR TAKE 1 TABLET (10 MEQ TOTAL) BY MOUTH ONCE DAILY.    prednisoLONE acetate (PRED FORTE) 1 % DrpS Place 1 drop into the left eye 2 (two) times daily.     roflumilast (DALIRESP) 500 mcg Tab Take 1 tablet (500 mcg total) by mouth once daily.    tamsulosin (FLOMAX) 0.4 mg Cap Take 1 capsule (0.4 mg total) by mouth once daily.    torsemide (DEMADEX) 20 MG Tab TAKE 2 TABLETS (40 MG TOTAL) BY MOUTH ONCE DAILY. DOUBLE DOSE FOR 2 LB WT GAIN IN 24 HRS    TRELEGY ELLIPTA 100-62.5-25 mcg DsDv INHALE 1 PUFF INTO THE LUNGS ONCE DAILY.    triamterene-hydrochlorothiazide 37.5-25 mg (MAXZIDE-25) 37.5-25 mg per tablet Take 1 tablet by mouth once daily.    vit C/E/Zn/coppr/lutein/zeaxan (PRESERVISION AREDS-2 ORAL) Take 1 capsule by mouth 2 (two) times a day.    vitamin D (VITAMIN D3) 1000 units Tab Take 1,000 Units by mouth 2 (two) times a day.    zinc gluconate 50 mg tablet Take 50 mg by mouth once daily.     No current facility-administered medications on file prior to visit.       CBC:  Lab Results   Component Value Date    WBC 8.82 10/13/2023    WBC 8.82 10/13/2023    HGB 12.5 (L) 10/13/2023    HGB 12.5 (L) 10/13/2023    HCT 39.9 (L) 10/13/2023    HCT 39.9 (L) 10/13/2023    MCV 88 10/13/2023    MCV 88 10/13/2023     10/13/2023     10/13/2023     CMP:  Sodium   Date Value Ref Range Status   10/13/2023 140 136 - 145 mmol/L Final     Potassium   Date Value Ref Range Status   10/13/2023 3.4 (L) 3.5 - 5.1 mmol/L Final     Chloride   Date Value Ref Range  Status   10/13/2023 97 95 - 110 mmol/L Final     CO2   Date Value Ref Range Status   10/13/2023 27 23 - 29 mmol/L Final     Glucose   Date Value Ref Range Status   10/13/2023 120 (H) 70 - 110 mg/dL Final     BUN   Date Value Ref Range Status   10/13/2023 28 10 - 30 mg/dL Final   10/13/2023 28 10 - 30 mg/dL Final     Creatinine   Date Value Ref Range Status   10/13/2023 1.1 0.5 - 1.4 mg/dL Final     Calcium   Date Value Ref Range Status   10/13/2023 9.7 8.7 - 10.5 mg/dL Final     Total Protein   Date Value Ref Range Status   10/13/2023 7.1 6.0 - 8.4 g/dL Final     Albumin   Date Value Ref Range Status   10/13/2023 3.8 3.5 - 5.2 g/dL Final     Total Bilirubin   Date Value Ref Range Status   10/13/2023 0.6 0.1 - 1.0 mg/dL Final     Comment:     For infants and newborns, interpretation of results should be based  on gestational age, weight and in agreement with clinical  observations.    Premature Infant recommended reference ranges:  Up to 24 hours.............<8.0 mg/dL  Up to 48 hours............<12.0 mg/dL  3-5 days..................<15.0 mg/dL  6-29 days.................<15.0 mg/dL       Alkaline Phosphatase   Date Value Ref Range Status   10/13/2023 90 55 - 135 U/L Final     AST   Date Value Ref Range Status   10/13/2023 15 10 - 40 U/L Final     ALT   Date Value Ref Range Status   10/13/2023 11 10 - 44 U/L Final     Anion Gap   Date Value Ref Range Status   10/13/2023 16 8 - 16 mmol/L Final     eGFR if    Date Value Ref Range Status   05/12/2022 >60 >60 mL/min/1.73 m^2 Final     eGFR if non    Date Value Ref Range Status   05/12/2022 59 (A) >60 mL/min/1.73 m^2 Final     Comment:     Calculation used to obtain the estimated glomerular filtration  rate (eGFR) is the CKD-EPI equation.          Lab Results   Component Value Date    FERRITIN 44 10/13/2023       Lab Results   Component Value Date    IRON 100 10/13/2023    TRANSFERRIN 245 10/13/2023    TIBC 363 10/13/2023    FESATURATED 28  10/13/2023        All pertinent labs and imaging reviewed.    Assessment:       1. Iron deficiency anemia, unspecified iron deficiency anemia type      # Microcytic anemia:  - Significant MCV and low Hb; dropping since at least June/2021  - Denies any active bleeding in his stool or changing in his diet, noted for patient to be on eliquis   - s/p Feraheme x2 in Dec/2021 with improvement in Hb and symptoms   -  risk vs benefit for discussion about ruling out bleeding/colon cancer in 89 years old patient,  discussion about risk of EGD/Colonoscopy ,would like to defer at this moment and cont monitoring   - patient was also instructed to focus on his diet and eat more iron loaded food, on PO iron to maintain the ferritin elevated   - Labs stable; continue oral iron - may use acid beverage instead of OJ; f/u in 6 months with CBC, Iron studies/ferritin prior - may be VV    # CKD; stable following up with PCP   # CAD/Cardiomyopathy/ Aflutter: following up with PCP/Cardiology on eliquis     Plan:     Iron deficiency anemia, unspecified iron deficiency anemia type  -     CBC Auto Differential; Future; Expected date: 10/17/2023  -     Iron and TIBC; Future; Expected date: 10/17/2023  -     FERRITIN; Future; Expected date: 10/17/2023      23  minutes were spent in coordination of patient's care, record review and counseling.    Route Chart for Scheduling    Med Onc Chart Routing      Follow up with physician    Follow up with [facility] 6 months. F/U IN 6 MONTHS with CBC, iRON STUDIES/FERRITIN prior   Infusion scheduling note    Injection scheduling note    Labs    Imaging    Pharmacy appointment    Other referrals                  Therapy Plan Information  Flushes  heparin, porcine (PF) 100 unit/mL injection flush 500 Units  500 Units, Intravenous, PRN  PRN Medications  EPINEPHrine (EPIPEN) 0.3 mg/0.3 mL pen injection 0.3 mg  0.3 mg, Intramuscular, PRN  diphenhydrAMINE injection 50 mg  50 mg, Intravenous, PRN  methylPREDNISolone  sodium succinate injection 125 mg  125 mg, Intravenous, PRN  sodium chloride 0.9% bolus 1,000 mL  1,000 mL, Intravenous, PRN             Answers submitted by the patient for this visit:  Review of Systems Questionnaire (Submitted on 10/17/2023)  appetite change : No  unexpected weight change: No  mouth sores: No     Yes

## 2024-05-15 ENCOUNTER — APPOINTMENT (OUTPATIENT)
Dept: OBGYN | Facility: CLINIC | Age: 39
End: 2024-05-15
Payer: COMMERCIAL

## 2024-05-15 VITALS
DIASTOLIC BLOOD PRESSURE: 75 MMHG | SYSTOLIC BLOOD PRESSURE: 116 MMHG | HEIGHT: 66 IN | BODY MASS INDEX: 22.98 KG/M2 | WEIGHT: 143 LBS

## 2024-05-15 DIAGNOSIS — N91.2 AMENORRHEA, UNSPECIFIED: ICD-10-CM

## 2024-05-15 PROCEDURE — 99213 OFFICE O/P EST LOW 20 MIN: CPT

## 2024-05-15 PROCEDURE — 36415 COLL VENOUS BLD VENIPUNCTURE: CPT

## 2024-05-15 RX ORDER — DOXYLAMINE SUCCINATE AND PYRIDOXINE HYDROCHLORIDE 10; 10 MG/1; MG/1
10-10 TABLET, DELAYED RELEASE ORAL
Qty: 45 | Refills: 2 | Status: ACTIVE | COMMUNITY
Start: 2024-05-15 | End: 1900-01-01

## 2024-05-15 RX ORDER — ASCORBIC ACID, CHOLECALCIFEROL, .ALPHA.-TOCOPHEROL ACETATE, DL-, PYRIDOXINE, FOLIC ACID, CYANOCOBALAMIN, CALCIUM, FERROUS FUMARATE, MAGNESIUM, DOCONEXENT 85; 200; 10; 25; 1; 12; 140; 27; 45; 300 [IU]/1; [IU]/1; [IU]/1; [IU]/1; MG/1; UG/1; MG/1; MG/1; MG/1; MG/1
27-0.6-0.4-3 CAPSULE, GELATIN COATED ORAL
Qty: 90 | Refills: 3 | Status: ACTIVE | COMMUNITY
Start: 2024-05-15 | End: 1900-01-01

## 2024-05-15 NOTE — COUNSELING
[Nutrition/ Exercise/ Weight Management] : nutrition, exercise, weight management [Body Image] : body image [Vitamins/Supplements] : vitamins/supplements [Other ___] : [unfilled] [place]al

## 2024-05-17 LAB
ABO + RH PNL BLD: NORMAL
APPEARANCE: CLEAR
BACTERIA UR CULT: NORMAL
BACTERIA: ABNORMAL /HPF
BASOPHILS # BLD AUTO: 0.06 K/UL
BASOPHILS NFR BLD AUTO: 0.7 %
BILIRUBIN URINE: NEGATIVE
BLD GP AB SCN SERPL QL: NORMAL
BLOOD URINE: NEGATIVE
C TRACH RRNA SPEC QL NAA+PROBE: NOT DETECTED
COLOR: YELLOW
EOSINOPHIL # BLD AUTO: 0.44 K/UL
EOSINOPHIL NFR BLD AUTO: 4.8 %
ESTIMATED AVERAGE GLUCOSE: 85 MG/DL
GLUCOSE QUALITATIVE U: NEGATIVE MG/DL
GLUCOSE SERPL-MCNC: 91 MG/DL
HBA1C MFR BLD HPLC: 4.6 %
HBV SURFACE AG SER QL: NONREACTIVE
HCT VFR BLD CALC: 38.6 %
HCV AB SER QL: NONREACTIVE
HCV S/CO RATIO: 0.1 S/CO
HGB BLD-MCNC: 13.8 G/DL
HIV1+2 AB SPEC QL IA.RAPID: NONREACTIVE
IMM GRANULOCYTES NFR BLD AUTO: 0.2 %
KETONES URINE: NEGATIVE MG/DL
LEAD BLD-MCNC: <1 UG/DL
LEUKOCYTE ESTERASE URINE: NEGATIVE
LYMPHOCYTES # BLD AUTO: 2.48 K/UL
LYMPHOCYTES NFR BLD AUTO: 26.9 %
MAN DIFF?: NORMAL
MCHC RBC-ENTMCNC: 32.8 PG
MCHC RBC-ENTMCNC: 35.8 GM/DL
MCV RBC AUTO: 91.7 FL
MEV IGG FLD QL IA: >300 AU/ML
MEV IGG+IGM SER-IMP: POSITIVE
MICROSCOPIC-UA: NORMAL
MONOCYTES # BLD AUTO: 0.58 K/UL
MONOCYTES NFR BLD AUTO: 6.3 %
MUV AB SER-ACNC: POSITIVE
MUV IGG SER QL IA: 112 AU/ML
N GONORRHOEA RRNA SPEC QL NAA+PROBE: NOT DETECTED
NEUTROPHILS # BLD AUTO: 5.63 K/UL
NEUTROPHILS NFR BLD AUTO: 61.1 %
NITRITE URINE: NEGATIVE
PH URINE: 6
PLATELET # BLD AUTO: 295 K/UL
PROTEIN URINE: NORMAL MG/DL
RBC # BLD: 4.21 M/UL
RBC # FLD: 12.4 %
RED BLOOD CELLS URINE: 1 /HPF
RUBV IGG FLD-ACNC: 11.5 INDEX
RUBV IGG SER-IMP: POSITIVE
SOURCE AMPLIFICATION: NORMAL
SPECIFIC GRAVITY URINE: >1.03
SQUAMOUS EPITHELIAL CELLS: PRESENT
T PALLIDUM AB SER QL IA: NEGATIVE
UROBILINOGEN URINE: 0.2 MG/DL
WBC # FLD AUTO: 9.21 K/UL
WHITE BLOOD CELLS URINE: 0 /HPF

## 2024-05-20 NOTE — HISTORY OF PRESENT ILLNESS
[N] : Patient does not use contraception [Y] : Positive pregnancy history [Currently Active] : currently active [Men] : men [No] : No [LMPDate] : 4/1/24 [PGHxTotal] : 3 [[facility]xFulerm] : 1 [[facility] Center[facility]] : 1 [PGHxABSpont] : 1 [FreeTextEntry1] :  x1, blighted ovum with DNC x1

## 2024-05-20 NOTE — PLAN
[FreeTextEntry1] : 37 y/o female presenting with amenorrhea  -+IUP and +FH seen on sono today -f/u PAP and GC/CT done today -f/u prenatal blood work drawn today -Rx sent for PNV  -RTO 2 weeks for first trimester ultrasound and review of prenatal lab results

## 2024-05-22 LAB
M TB IFN-G BLD-IMP: NEGATIVE
QUANTIFERON TB PLUS MITOGEN MINUS NIL: >10 IU/ML
QUANTIFERON TB PLUS NIL: 0.04 IU/ML
QUANTIFERON TB PLUS TB1 MINUS NIL: 0.01 IU/ML
QUANTIFERON TB PLUS TB2 MINUS NIL: 0 IU/ML

## 2024-05-29 ENCOUNTER — ASOB RESULT (OUTPATIENT)
Age: 39
End: 2024-05-29

## 2024-05-29 ENCOUNTER — APPOINTMENT (OUTPATIENT)
Dept: OBGYN | Facility: CLINIC | Age: 39
End: 2024-05-29
Payer: COMMERCIAL

## 2024-05-29 ENCOUNTER — APPOINTMENT (OUTPATIENT)
Dept: ANTEPARTUM | Facility: CLINIC | Age: 39
End: 2024-05-29
Payer: COMMERCIAL

## 2024-05-29 VITALS
HEIGHT: 66 IN | DIASTOLIC BLOOD PRESSURE: 69 MMHG | WEIGHT: 142 LBS | SYSTOLIC BLOOD PRESSURE: 103 MMHG | BODY MASS INDEX: 22.82 KG/M2

## 2024-05-29 PROCEDURE — 76801 OB US < 14 WKS SINGLE FETUS: CPT

## 2024-05-29 PROCEDURE — 0501F PRENATAL FLOW SHEET: CPT

## 2024-06-02 LAB
APPEARANCE: CLEAR
BACTERIA UR CULT: NORMAL
BACTERIA: ABNORMAL /HPF
BILIRUBIN URINE: NEGATIVE
BLOOD URINE: NEGATIVE
CAST: 2 /LPF
COLOR: NORMAL
EPITHELIAL CELLS: 14 /HPF
GLUCOSE QUALITATIVE U: NEGATIVE MG/DL
KETONES URINE: ABNORMAL MG/DL
LEUKOCYTE ESTERASE URINE: NEGATIVE
MICROSCOPIC-UA: NORMAL
NITRITE URINE: NEGATIVE
PH URINE: 6
PROTEIN URINE: NORMAL MG/DL
RED BLOOD CELLS URINE: 4 /HPF
SPECIFIC GRAVITY URINE: >1.03
UROBILINOGEN URINE: 1 MG/DL
WHITE BLOOD CELLS URINE: 0 /HPF

## 2024-06-10 ENCOUNTER — LABORATORY RESULT (OUTPATIENT)
Age: 39
End: 2024-06-10

## 2024-06-10 ENCOUNTER — APPOINTMENT (OUTPATIENT)
Dept: OBGYN | Facility: CLINIC | Age: 39
End: 2024-06-10
Payer: COMMERCIAL

## 2024-06-10 VITALS
SYSTOLIC BLOOD PRESSURE: 116 MMHG | HEIGHT: 66 IN | BODY MASS INDEX: 22.82 KG/M2 | WEIGHT: 142 LBS | DIASTOLIC BLOOD PRESSURE: 79 MMHG

## 2024-06-10 PROCEDURE — 0502F SUBSEQUENT PRENATAL CARE: CPT

## 2024-06-24 ENCOUNTER — APPOINTMENT (OUTPATIENT)
Dept: OBGYN | Facility: CLINIC | Age: 39
End: 2024-06-24
Payer: COMMERCIAL

## 2024-06-24 ENCOUNTER — ASOB RESULT (OUTPATIENT)
Age: 39
End: 2024-06-24

## 2024-06-24 ENCOUNTER — APPOINTMENT (OUTPATIENT)
Dept: ANTEPARTUM | Facility: CLINIC | Age: 39
End: 2024-06-24
Payer: COMMERCIAL

## 2024-06-24 VITALS
DIASTOLIC BLOOD PRESSURE: 78 MMHG | SYSTOLIC BLOOD PRESSURE: 113 MMHG | BODY MASS INDEX: 23.3 KG/M2 | HEIGHT: 66 IN | WEIGHT: 145 LBS

## 2024-06-24 DIAGNOSIS — Z34.90 ENCOUNTER FOR SUPERVISION OF NORMAL PREGNANCY, UNSPECIFIED, UNSPECIFIED TRIMESTER: ICD-10-CM

## 2024-06-24 PROCEDURE — 0502F SUBSEQUENT PRENATAL CARE: CPT

## 2024-06-24 PROCEDURE — 76813 OB US NUCHAL MEAS 1 GEST: CPT

## 2024-06-24 PROCEDURE — 76801 OB US < 14 WKS SINGLE FETUS: CPT

## 2024-06-29 LAB
ADDITIONAL US: NORMAL
CRL SCAN TWIN B: NORMAL
CRL SCAN: NORMAL
CROWN RUMP LENGTH TWIN B: NORMAL
CROWN RUMP LENGTH: 55.1 MM
DIA MOM: 0.72
DIA VALUE: 183.2 PG/ML
DOWN SYNDROME AGE RISK: NORMAL
DOWN SYNDROME INTERPRETATION: NORMAL
DOWN SYNDROME SCREENING RISK: NORMAL
FIRST TRIMESTER SCREEN COMMENTS: NORMAL
FIRST TRIMESTER SCREEN NOTE: NORMAL
FIRST TRIMESTER SCREEN RESULTS: NORMAL
FIRST TRIMESTER SCREEN TEST RESULTS: NORMAL
GEST. AGE ON COLLECTION DATE: 12 WEEKS
HCG MOM: 1
HCG VALUE: 93.2 IU/ML
MATERNAL AGE AT EDD: 39 YR
NT MOM TWIN B: NORMAL
NT TWIN B: NORMAL
NUCHAL TRANSLUCENCY (NT): 1.4 MM
NUCHAL TRANSLUCENCY MOM: 1.13
NUMBER OF FETUSES: 1
PAPP-A MOM: 1.83
PAPP-A VALUE: 1528.7 NG/ML
RACE: NORMAL
SONOGRAPHER ID#: NORMAL
TRISOMY 18 AGE RISK: NORMAL
TRISOMY 18 INTERPRETATION: NORMAL
TRISOMY 18 SCREENING RISK: NORMAL
WEIGHT AFP: 145 LBS

## 2024-07-22 ENCOUNTER — APPOINTMENT (OUTPATIENT)
Dept: OBGYN | Facility: CLINIC | Age: 39
End: 2024-07-22
Payer: COMMERCIAL

## 2024-07-22 VITALS
BODY MASS INDEX: 24.59 KG/M2 | SYSTOLIC BLOOD PRESSURE: 110 MMHG | DIASTOLIC BLOOD PRESSURE: 74 MMHG | WEIGHT: 153 LBS | HEIGHT: 66 IN

## 2024-07-22 DIAGNOSIS — Z3A.16 16 WEEKS GESTATION OF PREGNANCY: ICD-10-CM

## 2024-07-22 PROCEDURE — 0502F SUBSEQUENT PRENATAL CARE: CPT

## 2024-07-22 PROCEDURE — 36415 COLL VENOUS BLD VENIPUNCTURE: CPT

## 2024-07-24 LAB
AF-AFP DISCLAIMER: NORMAL
AFP  MOM: 0.78
AFP CONCENTRATION: 23.51 NG/ML
AFP INTERPRETATION: NORMAL
AFP MOM CUT-OFF: 2.5
AFP PERCENTILE: 21.7
AFP SCREENING RESULT: NORMAL
AFTER SCREENING RISK OPEN SPINA BIFIDA: NORMAL
BEFORE SCREENING RISK OPEN SPINA BIFIDA: NORMAL
EXTREME ANALYTE ALERT: NO
GESTATIONAL  AGE: NORMAL
MATERNAL WGT: 153
RACE/ETHNICITY: NORMAL

## 2024-08-19 ENCOUNTER — APPOINTMENT (OUTPATIENT)
Dept: ANTEPARTUM | Facility: CLINIC | Age: 39
End: 2024-08-19
Payer: COMMERCIAL

## 2024-08-19 ENCOUNTER — ASOB RESULT (OUTPATIENT)
Age: 39
End: 2024-08-19

## 2024-08-19 ENCOUNTER — APPOINTMENT (OUTPATIENT)
Dept: OBGYN | Facility: CLINIC | Age: 39
End: 2024-08-19
Payer: COMMERCIAL

## 2024-08-19 VITALS
HEIGHT: 66 IN | SYSTOLIC BLOOD PRESSURE: 114 MMHG | WEIGHT: 155 LBS | BODY MASS INDEX: 24.91 KG/M2 | DIASTOLIC BLOOD PRESSURE: 77 MMHG

## 2024-08-19 PROCEDURE — 0502F SUBSEQUENT PRENATAL CARE: CPT

## 2024-08-19 PROCEDURE — 76811 OB US DETAILED SNGL FETUS: CPT

## 2024-09-17 ENCOUNTER — LABORATORY RESULT (OUTPATIENT)
Age: 39
End: 2024-09-17

## 2024-09-17 ENCOUNTER — APPOINTMENT (OUTPATIENT)
Dept: OBGYN | Facility: CLINIC | Age: 39
End: 2024-09-17
Payer: COMMERCIAL

## 2024-09-17 VITALS
HEIGHT: 66 IN | DIASTOLIC BLOOD PRESSURE: 73 MMHG | SYSTOLIC BLOOD PRESSURE: 108 MMHG | BODY MASS INDEX: 25.88 KG/M2 | WEIGHT: 161 LBS

## 2024-09-17 DIAGNOSIS — Z3A.24 24 WEEKS GESTATION OF PREGNANCY: ICD-10-CM

## 2024-09-17 PROCEDURE — 36415 COLL VENOUS BLD VENIPUNCTURE: CPT

## 2024-09-17 PROCEDURE — 0502F SUBSEQUENT PRENATAL CARE: CPT

## 2024-09-18 LAB — GLUCOSE 1H P 50 G GLC PO SERPL-MCNC: 63 MG/DL

## 2024-09-19 LAB
BASOPHILS # BLD AUTO: 0.04 K/UL
BASOPHILS NFR BLD AUTO: 0.4 %
EOSINOPHIL # BLD AUTO: 0.36 K/UL
EOSINOPHIL NFR BLD AUTO: 3.8 %
HCT VFR BLD CALC: 39.1 %
HGB BLD-MCNC: 12.8 G/DL
IMM GRANULOCYTES NFR BLD AUTO: 0.5 %
LYMPHOCYTES # BLD AUTO: 1.8 K/UL
LYMPHOCYTES NFR BLD AUTO: 18.9 %
MAN DIFF?: NORMAL
MCHC RBC-ENTMCNC: 32.7 GM/DL
MCHC RBC-ENTMCNC: 34.8 PG
MCV RBC AUTO: 106.3 FL
MONOCYTES # BLD AUTO: 0.57 K/UL
MONOCYTES NFR BLD AUTO: 6 %
NEUTROPHILS # BLD AUTO: 6.69 K/UL
NEUTROPHILS NFR BLD AUTO: 70.4 %
PLATELET # BLD AUTO: 245 K/UL
RBC # BLD: 3.68 M/UL
RBC # FLD: 14 %
WBC # FLD AUTO: 9.51 K/UL

## 2024-10-14 ENCOUNTER — APPOINTMENT (OUTPATIENT)
Dept: OBGYN | Facility: CLINIC | Age: 39
End: 2024-10-14

## 2024-10-16 ENCOUNTER — APPOINTMENT (OUTPATIENT)
Dept: OBGYN | Facility: CLINIC | Age: 39
End: 2024-10-16
Payer: COMMERCIAL

## 2024-10-16 VITALS
HEIGHT: 66 IN | WEIGHT: 166 LBS | DIASTOLIC BLOOD PRESSURE: 69 MMHG | BODY MASS INDEX: 26.68 KG/M2 | SYSTOLIC BLOOD PRESSURE: 105 MMHG

## 2024-10-16 PROCEDURE — 96372 THER/PROPH/DIAG INJ SC/IM: CPT

## 2024-10-16 PROCEDURE — 0502F SUBSEQUENT PRENATAL CARE: CPT

## 2024-10-16 RX ORDER — HUMAN RHO(D) IMMUNE GLOBULIN 300 UG/1
1500 INJECTION, SOLUTION INTRAMUSCULAR
Qty: 0 | Refills: 0 | Status: COMPLETED | OUTPATIENT
Start: 2024-10-16

## 2024-10-16 RX ADMIN — HUMAN RHO(D) IMMUNE GLOBULIN 0 UNIT: 300 INJECTION, SOLUTION INTRAMUSCULAR at 00:00

## 2024-10-28 ENCOUNTER — APPOINTMENT (OUTPATIENT)
Dept: OBGYN | Facility: CLINIC | Age: 39
End: 2024-10-28
Payer: COMMERCIAL

## 2024-10-28 ENCOUNTER — APPOINTMENT (OUTPATIENT)
Dept: ANTEPARTUM | Facility: CLINIC | Age: 39
End: 2024-10-28
Payer: COMMERCIAL

## 2024-10-28 ENCOUNTER — ASOB RESULT (OUTPATIENT)
Age: 39
End: 2024-10-28

## 2024-10-28 VITALS
WEIGHT: 167 LBS | BODY MASS INDEX: 26.84 KG/M2 | SYSTOLIC BLOOD PRESSURE: 121 MMHG | HEIGHT: 66 IN | DIASTOLIC BLOOD PRESSURE: 79 MMHG

## 2024-10-28 DIAGNOSIS — O40.1XX0: ICD-10-CM

## 2024-10-28 PROCEDURE — 90656 IIV3 VACC NO PRSV 0.5 ML IM: CPT

## 2024-10-28 PROCEDURE — 0502F SUBSEQUENT PRENATAL CARE: CPT

## 2024-10-28 PROCEDURE — G0008: CPT

## 2024-10-28 PROCEDURE — 76819 FETAL BIOPHYS PROFIL W/O NST: CPT

## 2024-10-28 PROCEDURE — 76816 OB US FOLLOW-UP PER FETUS: CPT

## 2024-10-30 ENCOUNTER — NON-APPOINTMENT (OUTPATIENT)
Age: 39
End: 2024-10-30

## 2024-11-01 LAB
ESTIMATED AVERAGE GLUCOSE: 77 MG/DL
HBA1C MFR BLD HPLC: 4.3 %

## 2024-11-14 ENCOUNTER — NON-APPOINTMENT (OUTPATIENT)
Age: 39
End: 2024-11-14

## 2024-11-14 ENCOUNTER — APPOINTMENT (OUTPATIENT)
Dept: OBGYN | Facility: CLINIC | Age: 39
End: 2024-11-14
Payer: COMMERCIAL

## 2024-11-14 VITALS — DIASTOLIC BLOOD PRESSURE: 71 MMHG | WEIGHT: 164 LBS | SYSTOLIC BLOOD PRESSURE: 108 MMHG | BODY MASS INDEX: 26.47 KG/M2

## 2024-11-14 PROCEDURE — 0502F SUBSEQUENT PRENATAL CARE: CPT

## 2024-11-25 ENCOUNTER — ASOB RESULT (OUTPATIENT)
Age: 39
End: 2024-11-25

## 2024-11-25 ENCOUNTER — APPOINTMENT (OUTPATIENT)
Dept: ANTEPARTUM | Facility: CLINIC | Age: 39
End: 2024-11-25
Payer: COMMERCIAL

## 2024-11-25 ENCOUNTER — APPOINTMENT (OUTPATIENT)
Dept: OBGYN | Facility: CLINIC | Age: 39
End: 2024-11-25
Payer: COMMERCIAL

## 2024-11-25 VITALS
SYSTOLIC BLOOD PRESSURE: 106 MMHG | HEIGHT: 66 IN | BODY MASS INDEX: 26.68 KG/M2 | WEIGHT: 166 LBS | DIASTOLIC BLOOD PRESSURE: 72 MMHG

## 2024-11-25 DIAGNOSIS — Z23 ENCOUNTER FOR IMMUNIZATION: ICD-10-CM

## 2024-11-25 PROCEDURE — 76816 OB US FOLLOW-UP PER FETUS: CPT

## 2024-11-25 PROCEDURE — 76819 FETAL BIOPHYS PROFIL W/O NST: CPT

## 2024-11-25 PROCEDURE — 90678 RSV VACC PREF BIVALENT IM: CPT

## 2024-11-25 PROCEDURE — 0502F SUBSEQUENT PRENATAL CARE: CPT

## 2024-11-25 PROCEDURE — 90471 IMMUNIZATION ADMIN: CPT

## 2024-12-12 ENCOUNTER — APPOINTMENT (OUTPATIENT)
Dept: OBGYN | Facility: CLINIC | Age: 39
End: 2024-12-12
Payer: COMMERCIAL

## 2024-12-12 VITALS
WEIGHT: 168 LBS | DIASTOLIC BLOOD PRESSURE: 77 MMHG | SYSTOLIC BLOOD PRESSURE: 111 MMHG | HEIGHT: 66 IN | BODY MASS INDEX: 27 KG/M2

## 2024-12-12 DIAGNOSIS — Z3A.36 36 WEEKS GESTATION OF PREGNANCY: ICD-10-CM

## 2024-12-12 PROCEDURE — 90471 IMMUNIZATION ADMIN: CPT

## 2024-12-12 PROCEDURE — 0502F SUBSEQUENT PRENATAL CARE: CPT

## 2024-12-12 PROCEDURE — 36415 COLL VENOUS BLD VENIPUNCTURE: CPT

## 2024-12-12 PROCEDURE — 90715 TDAP VACCINE 7 YRS/> IM: CPT

## 2024-12-14 LAB
GP B STREP DNA SPEC QL NAA+PROBE: NOT DETECTED
HCT VFR BLD CALC: 38.8 %
HGB BLD-MCNC: 13.6 G/DL
HIV1+2 AB SPEC QL IA.RAPID: NONREACTIVE
MCHC RBC-ENTMCNC: 34.8 PG
MCHC RBC-ENTMCNC: 35.1 G/DL
MCV RBC AUTO: 99.2 FL
PLATELET # BLD AUTO: 232 K/UL
RBC # BLD: 3.91 M/UL
RBC # FLD: 13.5 %
SOURCE GBS: NORMAL
T PALLIDUM AB SER QL IA: NEGATIVE
WBC # FLD AUTO: 9.62 K/UL

## 2024-12-19 ENCOUNTER — APPOINTMENT (OUTPATIENT)
Dept: OBGYN | Facility: CLINIC | Age: 39
End: 2024-12-19
Payer: COMMERCIAL

## 2024-12-19 VITALS
DIASTOLIC BLOOD PRESSURE: 74 MMHG | WEIGHT: 168 LBS | BODY MASS INDEX: 27 KG/M2 | HEIGHT: 66 IN | SYSTOLIC BLOOD PRESSURE: 116 MMHG

## 2024-12-19 DIAGNOSIS — K64.9 UNSPECIFIED HEMORRHOIDS: ICD-10-CM

## 2024-12-19 PROCEDURE — 0502F SUBSEQUENT PRENATAL CARE: CPT

## 2024-12-26 RX ORDER — HYDROCORTISONE 25 MG/G
2.5 CREAM TOPICAL
Qty: 1 | Refills: 1 | Status: ACTIVE | COMMUNITY
Start: 2024-12-19 | End: 1900-01-01

## 2024-12-31 NOTE — OB RN TRIAGE NOTE - NSDCHEARTRATE_OBGYN_A_OB_NU
Left message for patient to return call to the clinic.     Please reschedule patient with PCP rather than Karina in March. (Labs prior to appointment.)   77